# Patient Record
Sex: MALE | Race: WHITE | Employment: OTHER | URBAN - NONMETROPOLITAN AREA
[De-identification: names, ages, dates, MRNs, and addresses within clinical notes are randomized per-mention and may not be internally consistent; named-entity substitution may affect disease eponyms.]

---

## 2017-04-25 ENCOUNTER — OFFICE VISIT (OUTPATIENT)
Dept: CARDIOLOGY | Age: 82
End: 2017-04-25

## 2017-04-25 VITALS
SYSTOLIC BLOOD PRESSURE: 122 MMHG | BODY MASS INDEX: 22.13 KG/M2 | HEIGHT: 68 IN | WEIGHT: 146 LBS | HEART RATE: 71 BPM | DIASTOLIC BLOOD PRESSURE: 68 MMHG

## 2017-04-25 DIAGNOSIS — R07.82 INTERCOSTAL PAIN: Primary | ICD-10-CM

## 2017-04-25 DIAGNOSIS — I42.9 CARDIOMYOPATHY (HCC): ICD-10-CM

## 2017-04-25 DIAGNOSIS — Z95.1 S/P CABG X 3: ICD-10-CM

## 2017-04-25 PROCEDURE — 93000 ELECTROCARDIOGRAM COMPLETE: CPT | Performed by: INTERNAL MEDICINE

## 2017-04-25 PROCEDURE — G8598 ASA/ANTIPLAT THER USED: HCPCS | Performed by: INTERNAL MEDICINE

## 2017-04-25 PROCEDURE — 1036F TOBACCO NON-USER: CPT | Performed by: INTERNAL MEDICINE

## 2017-04-25 PROCEDURE — 99213 OFFICE O/P EST LOW 20 MIN: CPT | Performed by: INTERNAL MEDICINE

## 2017-04-25 PROCEDURE — 4040F PNEUMOC VAC/ADMIN/RCVD: CPT | Performed by: INTERNAL MEDICINE

## 2017-04-25 PROCEDURE — G8419 CALC BMI OUT NRM PARAM NOF/U: HCPCS | Performed by: INTERNAL MEDICINE

## 2017-04-25 PROCEDURE — 1123F ACP DISCUSS/DSCN MKR DOCD: CPT | Performed by: INTERNAL MEDICINE

## 2017-04-25 PROCEDURE — G8428 CUR MEDS NOT DOCUMENT: HCPCS | Performed by: INTERNAL MEDICINE

## 2017-07-10 ENCOUNTER — TELEPHONE (OUTPATIENT)
Dept: FAMILY MEDICINE CLINIC | Age: 82
End: 2017-07-10

## 2017-07-13 ENCOUNTER — TELEPHONE (OUTPATIENT)
Dept: FAMILY MEDICINE CLINIC | Age: 82
End: 2017-07-13

## 2017-07-27 ENCOUNTER — OFFICE VISIT (OUTPATIENT)
Dept: FAMILY MEDICINE CLINIC | Age: 82
End: 2017-07-27
Payer: MEDICARE

## 2017-07-27 ENCOUNTER — TELEPHONE (OUTPATIENT)
Dept: FAMILY MEDICINE CLINIC | Age: 82
End: 2017-07-27

## 2017-07-27 VITALS
HEART RATE: 70 BPM | BODY MASS INDEX: 23.54 KG/M2 | TEMPERATURE: 98.2 F | RESPIRATION RATE: 16 BRPM | HEIGHT: 67 IN | DIASTOLIC BLOOD PRESSURE: 62 MMHG | SYSTOLIC BLOOD PRESSURE: 120 MMHG | WEIGHT: 150 LBS

## 2017-07-27 DIAGNOSIS — N40.0 BENIGN PROSTATIC HYPERPLASIA, PRESENCE OF LOWER URINARY TRACT SYMPTOMS UNSPECIFIED, UNSPECIFIED MORPHOLOGY: ICD-10-CM

## 2017-07-27 DIAGNOSIS — I25.10 CORONARY ARTERY DISEASE INVOLVING NATIVE CORONARY ARTERY OF NATIVE HEART WITHOUT ANGINA PECTORIS: Primary | ICD-10-CM

## 2017-07-27 DIAGNOSIS — K21.9 GASTROESOPHAGEAL REFLUX DISEASE, ESOPHAGITIS PRESENCE NOT SPECIFIED: ICD-10-CM

## 2017-07-27 DIAGNOSIS — K59.00 CONSTIPATION, UNSPECIFIED CONSTIPATION TYPE: ICD-10-CM

## 2017-07-27 DIAGNOSIS — R26.0 ATAXIC GAIT: ICD-10-CM

## 2017-07-27 DIAGNOSIS — R42 DIZZINESS: ICD-10-CM

## 2017-07-27 DIAGNOSIS — G20 PARKINSON'S DISEASE (HCC): Chronic | ICD-10-CM

## 2017-07-27 DIAGNOSIS — Z23 IMMUNIZATION DUE: ICD-10-CM

## 2017-07-27 DIAGNOSIS — R29.898 DECONDITIONED LOW BACK: ICD-10-CM

## 2017-07-27 DIAGNOSIS — I25.10 CORONARY ARTERY DISEASE INVOLVING NATIVE CORONARY ARTERY OF NATIVE HEART WITHOUT ANGINA PECTORIS: ICD-10-CM

## 2017-07-27 DIAGNOSIS — G89.29 CHRONIC HIP PAIN, LEFT: ICD-10-CM

## 2017-07-27 DIAGNOSIS — F41.9 ANXIETY: ICD-10-CM

## 2017-07-27 DIAGNOSIS — M25.552 CHRONIC HIP PAIN, LEFT: ICD-10-CM

## 2017-07-27 PROCEDURE — G8427 DOCREV CUR MEDS BY ELIG CLIN: HCPCS | Performed by: NURSE PRACTITIONER

## 2017-07-27 PROCEDURE — G8420 CALC BMI NORM PARAMETERS: HCPCS | Performed by: NURSE PRACTITIONER

## 2017-07-27 PROCEDURE — 1123F ACP DISCUSS/DSCN MKR DOCD: CPT | Performed by: NURSE PRACTITIONER

## 2017-07-27 PROCEDURE — 1036F TOBACCO NON-USER: CPT | Performed by: NURSE PRACTITIONER

## 2017-07-27 PROCEDURE — 4040F PNEUMOC VAC/ADMIN/RCVD: CPT | Performed by: NURSE PRACTITIONER

## 2017-07-27 PROCEDURE — G0009 ADMIN PNEUMOCOCCAL VACCINE: HCPCS | Performed by: NURSE PRACTITIONER

## 2017-07-27 PROCEDURE — 99204 OFFICE O/P NEW MOD 45 MIN: CPT | Performed by: NURSE PRACTITIONER

## 2017-07-27 PROCEDURE — G8598 ASA/ANTIPLAT THER USED: HCPCS | Performed by: NURSE PRACTITIONER

## 2017-07-27 PROCEDURE — 90670 PCV13 VACCINE IM: CPT | Performed by: NURSE PRACTITIONER

## 2017-07-27 RX ORDER — TRAMADOL HYDROCHLORIDE 50 MG/1
50 TABLET ORAL DAILY
Qty: 30 TABLET | Refills: 0 | Status: SHIPPED | OUTPATIENT
Start: 2017-07-27 | End: 2017-08-26

## 2017-07-27 RX ORDER — PSEUDOEPHEDRINE HCL 30 MG
100 TABLET ORAL 2 TIMES DAILY
Qty: 180 CAPSULE | Refills: 3 | Status: SHIPPED | OUTPATIENT
Start: 2017-07-27

## 2017-07-27 RX ORDER — TAMSULOSIN HYDROCHLORIDE 0.4 MG/1
0.4 CAPSULE ORAL DAILY
Qty: 30 CAPSULE | Refills: 11 | Status: SHIPPED | OUTPATIENT
Start: 2017-07-27 | End: 2019-01-01 | Stop reason: SDUPTHER

## 2017-07-27 RX ORDER — OMEPRAZOLE 20 MG/1
20 CAPSULE, DELAYED RELEASE ORAL DAILY
Qty: 30 CAPSULE | Refills: 6 | Status: SHIPPED | OUTPATIENT
Start: 2017-07-27 | End: 2017-10-24 | Stop reason: SDUPTHER

## 2017-07-27 RX ORDER — GABAPENTIN 100 MG/1
100 CAPSULE ORAL 3 TIMES DAILY
Qty: 90 CAPSULE | Refills: 6 | Status: SHIPPED | OUTPATIENT
Start: 2017-07-27 | End: 2017-10-24 | Stop reason: SDUPTHER

## 2017-07-27 RX ORDER — PAROXETINE 30 MG/1
30 TABLET, FILM COATED ORAL EVERY MORNING
Qty: 30 TABLET | Refills: 6 | Status: SHIPPED | OUTPATIENT
Start: 2017-07-27 | End: 2017-10-24 | Stop reason: SDUPTHER

## 2017-07-27 RX ORDER — CARVEDILOL 3.12 MG/1
3.12 TABLET ORAL 2 TIMES DAILY WITH MEALS
COMMUNITY
End: 2017-07-27 | Stop reason: SDUPTHER

## 2017-07-27 RX ORDER — MECLIZINE HCL 12.5 MG/1
12.5 TABLET ORAL 3 TIMES DAILY PRN
Qty: 90 TABLET | Refills: 6 | Status: SHIPPED | OUTPATIENT
Start: 2017-07-27 | End: 2017-10-24 | Stop reason: SDUPTHER

## 2017-07-27 RX ORDER — GABAPENTIN 100 MG/1
100 CAPSULE ORAL 3 TIMES DAILY
COMMUNITY
End: 2017-07-27 | Stop reason: SDUPTHER

## 2017-07-27 ASSESSMENT — PATIENT HEALTH QUESTIONNAIRE - PHQ9
1. LITTLE INTEREST OR PLEASURE IN DOING THINGS: 0
SUM OF ALL RESPONSES TO PHQ9 QUESTIONS 1 & 2: 0
2. FEELING DOWN, DEPRESSED OR HOPELESS: 0
SUM OF ALL RESPONSES TO PHQ QUESTIONS 1-9: 0

## 2017-07-27 ASSESSMENT — ENCOUNTER SYMPTOMS
BACK PAIN: 1
SINUS PRESSURE: 0
RESPIRATORY NEGATIVE: 1
ABDOMINAL PAIN: 0
RHINORRHEA: 0
ABDOMINAL DISTENTION: 0

## 2017-07-28 RX ORDER — ISOSORBIDE MONONITRATE 30 MG/1
30 TABLET, EXTENDED RELEASE ORAL DAILY
Qty: 90 TABLET | Refills: 0 | Status: SHIPPED | OUTPATIENT
Start: 2017-07-28 | End: 2017-10-26 | Stop reason: SDUPTHER

## 2017-07-28 RX ORDER — CLOPIDOGREL BISULFATE 75 MG/1
75 TABLET ORAL DAILY
Qty: 90 TABLET | Refills: 0 | Status: SHIPPED | OUTPATIENT
Start: 2017-07-28 | End: 2017-10-26 | Stop reason: SDUPTHER

## 2017-07-28 RX ORDER — CARVEDILOL 3.12 MG/1
3.12 TABLET ORAL 2 TIMES DAILY WITH MEALS
Qty: 180 TABLET | Refills: 0 | Status: SHIPPED | OUTPATIENT
Start: 2017-07-28 | End: 2017-10-26 | Stop reason: SDUPTHER

## 2017-09-12 ENCOUNTER — TELEPHONE (OUTPATIENT)
Dept: FAMILY MEDICINE CLINIC | Age: 82
End: 2017-09-12

## 2017-10-24 ENCOUNTER — TELEPHONE (OUTPATIENT)
Dept: FAMILY MEDICINE CLINIC | Age: 82
End: 2017-10-24

## 2017-10-24 ENCOUNTER — OFFICE VISIT (OUTPATIENT)
Dept: FAMILY MEDICINE CLINIC | Age: 82
End: 2017-10-24
Payer: MEDICARE

## 2017-10-24 VITALS — SYSTOLIC BLOOD PRESSURE: 128 MMHG | TEMPERATURE: 98.1 F | DIASTOLIC BLOOD PRESSURE: 82 MMHG | HEART RATE: 70 BPM

## 2017-10-24 DIAGNOSIS — G20 PARKINSON'S DISEASE (HCC): Chronic | ICD-10-CM

## 2017-10-24 DIAGNOSIS — K21.9 GASTROESOPHAGEAL REFLUX DISEASE, ESOPHAGITIS PRESENCE NOT SPECIFIED: ICD-10-CM

## 2017-10-24 DIAGNOSIS — R53.1 GENERALIZED WEAKNESS: ICD-10-CM

## 2017-10-24 DIAGNOSIS — Z23 NEEDS FLU SHOT: ICD-10-CM

## 2017-10-24 DIAGNOSIS — F02.818 LATE ONSET ALZHEIMER'S DISEASE WITH BEHAVIORAL DISTURBANCE (HCC): Primary | ICD-10-CM

## 2017-10-24 DIAGNOSIS — F41.9 ANXIETY: ICD-10-CM

## 2017-10-24 DIAGNOSIS — M25.552 LEFT HIP PAIN: ICD-10-CM

## 2017-10-24 DIAGNOSIS — R53.1 WEAKNESS: ICD-10-CM

## 2017-10-24 DIAGNOSIS — G30.1 LATE ONSET ALZHEIMER'S DISEASE WITH BEHAVIORAL DISTURBANCE (HCC): Primary | ICD-10-CM

## 2017-10-24 PROCEDURE — 90688 IIV4 VACCINE SPLT 0.5 ML IM: CPT | Performed by: NURSE PRACTITIONER

## 2017-10-24 PROCEDURE — G8427 DOCREV CUR MEDS BY ELIG CLIN: HCPCS | Performed by: NURSE PRACTITIONER

## 2017-10-24 PROCEDURE — 1036F TOBACCO NON-USER: CPT | Performed by: NURSE PRACTITIONER

## 2017-10-24 PROCEDURE — G8484 FLU IMMUNIZE NO ADMIN: HCPCS | Performed by: NURSE PRACTITIONER

## 2017-10-24 PROCEDURE — G8420 CALC BMI NORM PARAMETERS: HCPCS | Performed by: NURSE PRACTITIONER

## 2017-10-24 PROCEDURE — G8598 ASA/ANTIPLAT THER USED: HCPCS | Performed by: NURSE PRACTITIONER

## 2017-10-24 PROCEDURE — 4040F PNEUMOC VAC/ADMIN/RCVD: CPT | Performed by: NURSE PRACTITIONER

## 2017-10-24 PROCEDURE — 1123F ACP DISCUSS/DSCN MKR DOCD: CPT | Performed by: NURSE PRACTITIONER

## 2017-10-24 PROCEDURE — 99214 OFFICE O/P EST MOD 30 MIN: CPT | Performed by: NURSE PRACTITIONER

## 2017-10-24 PROCEDURE — G0008 ADMIN INFLUENZA VIRUS VAC: HCPCS | Performed by: NURSE PRACTITIONER

## 2017-10-24 RX ORDER — PAROXETINE 30 MG/1
30 TABLET, FILM COATED ORAL EVERY MORNING
Qty: 30 TABLET | Refills: 6 | Status: SHIPPED | OUTPATIENT
Start: 2017-10-24 | End: 2018-05-15 | Stop reason: SDUPTHER

## 2017-10-24 RX ORDER — OMEPRAZOLE 20 MG/1
20 CAPSULE, DELAYED RELEASE ORAL DAILY
Qty: 30 CAPSULE | Refills: 6 | Status: SHIPPED | OUTPATIENT
Start: 2017-10-24 | End: 2018-05-15 | Stop reason: SDUPTHER

## 2017-10-24 RX ORDER — MECLIZINE HCL 12.5 MG/1
12.5 TABLET ORAL 3 TIMES DAILY PRN
Qty: 90 TABLET | Refills: 6 | Status: SHIPPED | OUTPATIENT
Start: 2017-10-24 | End: 2018-05-15 | Stop reason: SDUPTHER

## 2017-10-24 RX ORDER — GABAPENTIN 100 MG/1
100 CAPSULE ORAL 3 TIMES DAILY
Qty: 90 CAPSULE | Refills: 6 | Status: SHIPPED | OUTPATIENT
Start: 2017-10-24 | End: 2018-05-15 | Stop reason: SDUPTHER

## 2017-10-24 ASSESSMENT — ENCOUNTER SYMPTOMS
CONSTIPATION: 0
SHORTNESS OF BREATH: 1
COUGH: 0
ABDOMINAL DISTENTION: 0
WHEEZING: 0
RHINORRHEA: 0
CHEST TIGHTNESS: 0
NAUSEA: 1
STRIDOR: 0
CHOKING: 0
VOMITING: 0
DIARRHEA: 0
ABDOMINAL PAIN: 0
SINUS PAIN: 0

## 2017-10-24 NOTE — PROGRESS NOTES
[address]  [address] [address]  Dept: [phone]  Dept Fax: [phone]  Loc: [phone]    Gene Claudeen Anton is a 80 y.o. male who presents today for his medical conditions/complaints as noted below. Gene Claudeen Anton is c/o of 3 Month Follow-Up (anxiety) and Hip Pain (left, worse in the last month)      HPI:     Pt here for a follow up . Pt is currently living at home with his two sons. He has been diagnosed with Alzheimer;s Dementia with behavioral disturbance. He had been on namenda In the past and taken off. He is not oriented to person, place or time. He does answer questions when asked and verbalized normally. Sons state they have noticed for the last week their father has been getting agitated in the late evening and early morning and seeing things that are not there. He has waved his arms around at one point but has not been physical.      Sons state his left hip has been hurting and it has impacted his ability to move and get around, so he has stayed in bed a lot during the day and his wheelchair. His legs have gotten weak and unsteady and he is weak in general. He has been eating less and has a decreased appetite. He has an extensive heart history and is current with Cardiology. He is taking his acid reflux meds and his symptoms are controlled.           Current Outpatient Prescriptions   Medication Sig Dispense Refill    gabapentin (NEURONTIN) 100 MG capsule Take 1 capsule by mouth 3 times daily 90 capsule 6    omeprazole (PRILOSEC) 20 MG delayed release capsule Take 1 capsule by mouth daily 30 capsule 6    PARoxetine (PAXIL) 30 MG tablet Take 1 tablet by mouth every morning 30 tablet 6    meclizine (ANTIVERT) 12.5 MG tablet Take 1 tablet by mouth 3 times daily as needed for Dizziness 90 tablet 6    clopidogrel (PLAVIX) 75 MG tablet Take 1 tablet by mouth daily 90 tablet 0    isosorbide mononitrate (IMDUR) 30 MG extended release tablet Take 1 tablet by mouth daily 90 tablet 0    carvedilol (COREG) 3.125 MG tablet Take 1 tablet by mouth 2 times daily (with meals) 180 tablet 0    aspirin 81 MG tablet Take 1 tablet by mouth daily 30 tablet 11    tamsulosin (FLOMAX) 0.4 MG capsule Take 1 capsule by mouth daily 30 capsule 11    Calcium Carbonate Antacid (TUMS PO) Take by mouth 4 times daily      traMADol (ULTRAM) 50 MG tablet Take 50 mg by mouth every 6 hours as needed for Pain      Misc. Devices [facility]) MISC 1 applicator by Does not apply route continuous. 1 each 0    acetaminophen (TYLENOL) 325 MG tablet Take 2 tablets by mouth every 6 hours as needed for Pain (For mild pain level 1-3 or for fever > 100.5). 120 tablet 0    nitroGLYCERIN (NITROSTAT) 0.4 MG SL tablet Place 1 tablet under the tongue every 5 minutes as needed for Chest pain. 25 tablet 6    docusate (COLACE, DULCOLAX) 100 MG CAPS Take 100 mg by mouth 2 times daily 180 capsule 3    aluminum & magnesium hydroxide-simethicone (MAALOX ADVANCED) 200-200-20 MG/5ML SUSP suspension Take 5 mLs by mouth every 6 hours as needed for Indigestion. 1 Bottle 0    Cyanocobalamin (VITAMIN B-12 CR) 1000 MCG TBCR Take 1,000 mcg by mouth daily.  Cholecalciferol (VITAMIN D-3) 5000 UNITS TABS Take 1 tablet by mouth daily.  magnesium hydroxide (MILK OF MAGNESIA) 400 MG/5ML suspension Take 30 mLs by mouth daily as needed for Constipation.  multivitamin (THERAGRAN) per tablet Take 1 tablet by mouth daily. 90 tablet 0     No current facility-administered medications for this visit.         Past Medical History:   Diagnosis Date    Alzheimer's dementia     Anxiety     Arthritis     At[facility] fibrillation ([facility] [address]     CAD (coronary artery disease)     Dementia     Depression     Fall at home     GERD (gastroesophageal reflux disease)     Heart attack 5/11/13    Hernia     Hyperlipidemia     Hypertension     Iron deficiency anemia     Irritable bowel syndrome     Pacemaker age 58    Parkinson disease ([facility] [address]     Parkinsons [facility])       Past Surgical History:   Procedure Laterality Date    BACK SURGERY  age [de-identified]     CARDIAC SURGERY  age 60    COLONOSCOPY  over ten years ago   Maryse Watson  Patient unsure     PACEMAKER PLACEMENT  age 58, replaced age 80    VASCULAR SURGERY  age 58      Family History   Problem Relation Age of Onset    Heart Disease Father      Social History   Substance Use Topics    Smoking status: Former Smoker     Years: 3.00     Types: Cigars     Quit date: 10/11/2008    Smokeless tobacco: Never Used    Alcohol use No        Allergies   Allergen Reactions    Pcn [Penicillins] Hives    Terramycin [Oxytetracycline]        Health Maintenance   Topic Date Due    Zostavax vaccine  03/15/1990    Flu vaccine (1) 09/01/2017    Pneumococcal low/med risk (2 of 2 - PPSV23) 07/27/2018    DTaP/Tdap/Td vaccine (2 - Td) 03/19/2025       Subjective:      Review of Systems   Constitutional: Positive for fatigue (easily fatigues with activity). Negative for chills and fever. HENT: Negative for congestion, rhinorrhea and sinus pain. Respiratory: Positive for shortness of breath (with exertion). Negative for cough, choking, chest tightness, wheezing and stridor. Cardiovascular: Negative. Gastrointestinal: Positive for nausea. Negative for abdominal distention, abdominal pain, constipation, diarrhea and vomiting. Genitourinary: Negative for difficulty urinating and dysuria. Musculoskeletal: Positive for arthralgias, gait problem and myalgias. Skin: Negative. Neurological: Positive for weakness. Negative for dizziness, tremors and headaches. Psychiatric/Behavioral: Positive for agitation, behavioral problems and sleep disturbance. Negative for self-injury and suicidal ideas.        Objective:     /82   Pulse 70   Temp 98.1 °F (36.7 °C) (Oral)     Physical Exam   Constitutional: He appears well-developed and well-nourished. No distress. HENT:   Right Ear: Hearing, tympanic membrane, external ear and ear canal normal.   Left Ear: Hearing, tympanic membrane, external ear and ear canal normal.   Nose: Nose normal.   Mouth/Throat: Oropharynx is clear and moist.   Cardiovascular: Normal rate, regular rhythm, normal heart sounds and intact distal pulses. No murmur heard. Pulmonary/Chest: Effort normal and breath sounds normal. He has no wheezes. Abdominal: Soft. Bowel sounds are normal. He exhibits no distension. There is no tenderness. Musculoskeletal:        Left hip: He exhibits decreased range of motion, decreased strength and bony tenderness. He exhibits no swelling and no crepitus. Pt presents in a wheelchair today, patient stood up and unable to ambulate without an unsteady gait, shuffling gait noted. .    Bilateral LE weak all LE pulses equal and strong  Upper extremity strength equal and weak. Neurological: He is alert. He is disoriented (pt not oriented to place or time oriented to person). He displays no tremor. No cranial nerve deficit or sensory deficit. He exhibits abnormal muscle tone. Coordination and gait abnormal.   Skin: Skin is warm and dry. Psychiatric: He has a normal mood and affect. Judgment normal. His speech is delayed. He is not combative. Thought content is not delusional. He expresses no suicidal plans and no homicidal plans. He exhibits abnormal recent memory and abnormal remote memory. He is inattentive. Nursing note and vitals reviewed. Assessment/Plan:          1. Left hip pain    - XR HIP LEFT (2-3 VIEWS); Future  - [facility]. with tylenol, will await x-ray results  2. Late onset Alzheimer's disease with behavioral disturbance  Has Neurology app[facility] coming up  [facility]    3. Generalized weakness    - [facility]    4. Parkinson's disease [facility])  Await Neurology input and evaluation    5.  Anxiety    - PARoxetine (PAXIL) 30 MG tablet; Take 1 tablet by mouth every morning  Dispense: 30 tablet; Refill: 6        - gabapentin (NEURONTIN) 100 MG capsule; Take 1 capsule by mouth 3 times daily  Dispense: 90 capsule; Refill: 6    7. Gastroesophageal reflux disease, esophagitis presence not specified  GERD diet  - omeprazole (PRILOSEC) 20 MG delayed release capsule; Take 1 capsule by mouth daily  Dispense: 30 capsule; Refill: 6    - meclizine (ANTIVERT) 12.5 MG tablet; Take 1 tablet by mouth 3 times daily as needed for Dizziness  Dispense: 90 tablet; Refill: 6    9. Needs flu shot    - INFLUENZA, QUADV, 3 YRS AND OLDER, IM, MDV, 0.5ML (Roberto Landry)    10. Weakness    - [address]      Return in about 6 months (around 4/24/2018) for check up and med refill. Reccommended tobacco cessation options including pharmacologic methods, counseled great than 3 minutes during this visit:  Yes  []  No  []       Patient given educational materials - see patient instructions. Discussed use, benefit, and side effects of prescribed medications. All patient questions answered. Pt voiced understanding. Reviewed health maintenance. Instructed to continue current medications, diet and exercise. Patient agreed with treatment plan. Follow up as directed.        Electronically signed by Mariia Flores CNP on 10/24/2017 at 1:36 PM

## 2017-10-26 DIAGNOSIS — I25.10 CORONARY ARTERY DISEASE INVOLVING NATIVE CORONARY ARTERY OF NATIVE HEART WITHOUT ANGINA PECTORIS: ICD-10-CM

## 2017-10-26 RX ORDER — CARVEDILOL 3.12 MG/1
3.12 TABLET ORAL 2 TIMES DAILY WITH MEALS
Qty: 180 TABLET | Refills: 0 | Status: SHIPPED | OUTPATIENT
Start: 2017-10-26 | End: 2018-01-16 | Stop reason: SDUPTHER

## 2017-10-26 RX ORDER — ISOSORBIDE MONONITRATE 30 MG/1
30 TABLET, EXTENDED RELEASE ORAL DAILY
Qty: 90 TABLET | Refills: 0 | Status: SHIPPED | OUTPATIENT
Start: 2017-10-26 | End: 2018-01-16 | Stop reason: SDUPTHER

## 2017-10-26 RX ORDER — CLOPIDOGREL BISULFATE 75 MG/1
75 TABLET ORAL DAILY
Qty: 90 TABLET | Refills: 0 | Status: SHIPPED | OUTPATIENT
Start: 2017-10-26 | End: 2018-01-16 | Stop reason: SDUPTHER

## 2017-10-26 NOTE — TELEPHONE ENCOUNTER
Alfred Phan called requesting a refill on the following medications:  Requested Prescriptions     Pending Prescriptions Disp Refills    isosorbide mononitrate (IMDUR) 30 MG extended release tablet 90 tablet 0     Sig: Take 1 tablet by mouth daily    clopidogrel (PLAVIX) 75 MG tablet 90 tablet 0     Sig: Take 1 tablet by mouth daily    carvedilol (COREG) 3.125 MG tablet 180 tablet 0     Sig: Take 1 tablet by mouth 2 times daily (with meals)     Pharmacy verified:  [email or web address]      Date of last visit: 4/25/17  Date of next visit (if applicable): 76/26/0987        Date of last fill and quantity (to be completed by clinical staff)  Pharmacy name: [facility] on [address]

## 2017-12-07 ENCOUNTER — TELEPHONE (OUTPATIENT)
Dept: FAMILY MEDICINE CLINIC | Age: 82
End: 2017-12-07

## 2017-12-07 NOTE — TELEPHONE ENCOUNTER
Leigh Travis from [facility] OT wanting to inform you pt declined services today states he fell over the weekend, he was standing in front of his wheel chair and he toppled backwards pulling the chair on top of him he didn't go to ER and he refused getting x-rays he is c/o pain in the right rib area. Pt has an appt with you 12/13/17.   Pt also has [facility] PT and they saw pt this am.

## 2017-12-13 ENCOUNTER — TELEPHONE (OUTPATIENT)
Dept: FAMILY MEDICINE CLINIC | Age: 82
End: 2017-12-13

## 2018-01-16 ENCOUNTER — OFFICE VISIT (OUTPATIENT)
Dept: FAMILY MEDICINE CLINIC | Age: 83
End: 2018-01-16
Payer: MEDICARE

## 2018-01-16 VITALS
BODY MASS INDEX: 25.4 KG/M2 | SYSTOLIC BLOOD PRESSURE: 148 MMHG | DIASTOLIC BLOOD PRESSURE: 104 MMHG | TEMPERATURE: 97.7 F | WEIGHT: 148.8 LBS | HEIGHT: 64 IN | HEART RATE: 62 BPM

## 2018-01-16 DIAGNOSIS — R10.9 STOMACH PAIN: ICD-10-CM

## 2018-01-16 DIAGNOSIS — I10 HYPERTENSION, UNSPECIFIED TYPE: Primary | ICD-10-CM

## 2018-01-16 DIAGNOSIS — M25.552 LEFT HIP PAIN: ICD-10-CM

## 2018-01-16 DIAGNOSIS — I25.10 CORONARY ARTERY DISEASE INVOLVING NATIVE CORONARY ARTERY OF NATIVE HEART WITHOUT ANGINA PECTORIS: ICD-10-CM

## 2018-01-16 PROCEDURE — G8427 DOCREV CUR MEDS BY ELIG CLIN: HCPCS | Performed by: NURSE PRACTITIONER

## 2018-01-16 PROCEDURE — 1036F TOBACCO NON-USER: CPT | Performed by: NURSE PRACTITIONER

## 2018-01-16 PROCEDURE — 1123F ACP DISCUSS/DSCN MKR DOCD: CPT | Performed by: NURSE PRACTITIONER

## 2018-01-16 PROCEDURE — G8484 FLU IMMUNIZE NO ADMIN: HCPCS | Performed by: NURSE PRACTITIONER

## 2018-01-16 PROCEDURE — G8419 CALC BMI OUT NRM PARAM NOF/U: HCPCS | Performed by: NURSE PRACTITIONER

## 2018-01-16 PROCEDURE — G8598 ASA/ANTIPLAT THER USED: HCPCS | Performed by: NURSE PRACTITIONER

## 2018-01-16 PROCEDURE — 99214 OFFICE O/P EST MOD 30 MIN: CPT | Performed by: NURSE PRACTITIONER

## 2018-01-16 PROCEDURE — 4040F PNEUMOC VAC/ADMIN/RCVD: CPT | Performed by: NURSE PRACTITIONER

## 2018-01-16 RX ORDER — ISOSORBIDE MONONITRATE 30 MG/1
30 TABLET, EXTENDED RELEASE ORAL DAILY
Qty: 90 TABLET | Refills: 1 | Status: SHIPPED | OUTPATIENT
Start: 2018-01-16 | End: 2018-02-06 | Stop reason: SDUPTHER

## 2018-01-16 RX ORDER — CELECOXIB 100 MG/1
100 CAPSULE ORAL 2 TIMES DAILY
Qty: 60 CAPSULE | Refills: 3 | Status: SHIPPED | OUTPATIENT
Start: 2018-01-16 | End: 2018-05-15 | Stop reason: SDUPTHER

## 2018-01-16 RX ORDER — LISINOPRIL 5 MG/1
5 TABLET ORAL DAILY
Qty: 90 TABLET | Refills: 1 | Status: SHIPPED | OUTPATIENT
Start: 2018-01-16 | End: 2018-02-06 | Stop reason: SDUPTHER

## 2018-01-16 RX ORDER — CARVEDILOL 3.12 MG/1
3.12 TABLET ORAL 2 TIMES DAILY WITH MEALS
Qty: 180 TABLET | Refills: 1 | Status: SHIPPED | OUTPATIENT
Start: 2018-01-16 | End: 2018-02-06 | Stop reason: SDUPTHER

## 2018-01-16 RX ORDER — CLOPIDOGREL BISULFATE 75 MG/1
75 TABLET ORAL DAILY
Qty: 90 TABLET | Refills: 1 | Status: SHIPPED | OUTPATIENT
Start: 2018-01-16 | End: 2018-02-06 | Stop reason: SDUPTHER

## 2018-01-16 ASSESSMENT — ENCOUNTER SYMPTOMS
SINUS PAIN: 0
ABDOMINAL PAIN: 0
NAUSEA: 1
RECTAL PAIN: 0
VOMITING: 0
RHINORRHEA: 0
DIARRHEA: 0
SINUS PRESSURE: 0
ABDOMINAL DISTENTION: 0
BACK PAIN: 1
RESPIRATORY NEGATIVE: 1

## 2018-01-16 NOTE — PROGRESS NOTES
[address]  [address] [address]  Dept: [phone]  Dept Fax: [phone]  Loc: [phone]    Alfred Long is a 80 y.o. male who presents today for his medical conditions/complaints as noted below. Alfred Long is c/o of Hip Pain (left hip pain radiating down left leg for past 3 months); Insomnia (awake all night, and sleeping all day for past 4 months ); and Abdominal Pain (stomach pains after eating for past 2 months )      HPI:     Pt presents for a follow up visit. Pt has continued left hip pain and his son states it is getting worse. The patient states the pain occurs at random throughout the day, the pain starts at the left lumbar area and will travel to the left hip and groin area and travel down the left leg. The patient rates the pain as 5/10 it comes and goes, sitting in his wheelchair makes it worse and then he gets stiff and does not want to get up and move around. The pain is a deep ache and he denies numbness or tingling in his leg. He has been taking 2 500 extra strength tablets three times a day. He states this does help but the pain comes back. He has not tried ibuprofen he has used norco in the past. He has been taking neurontin 100 mg tid for several months and does not think it is helping. Pt son also mentioned pt is sleeping all day and then he is up and wired at night. I did discuss the possibility that the neurontin is making the pt tired and this could be the cause of sleeping all day. With shared decision making the treatment plan to be implemented is to wean off the am dose of neurontin and monitor the pain, If pain worsens return to neurontin if pain is staying the same after one week wean off the afternoon dose and keep the  Pm dose for the time being. Will also add celebrex in addition to tylenol for pain control and monitor.       I did ask the son about physical therapy and he states his father did Review of Systems   Constitutional: Negative for chills, fatigue and fever. HENT: Negative for rhinorrhea, sinus pain and sinus pressure. Respiratory: Negative. Cardiovascular: Negative. Gastrointestinal: Positive for nausea (off and on after eating, once or twice a week. ). Negative for abdominal distention, abdominal pain, diarrhea, rectal pain and vomiting. He does not have weight loss   Genitourinary: Negative for difficulty urinating and dysuria. Musculoskeletal: Positive for arthralgias, back pain, gait problem and myalgias. Wheelchair bound   Skin: Negative. Neurological: Negative for dizziness and headaches. Psychiatric/Behavioral: Negative for self-injury, sleep disturbance and suicidal ideas. Objective:     BP (!) 148/104   Pulse 62   Temp 97.7 °F (36.5 °C) (Oral)   Ht 5' 4\" (1.626 m)   Wt 148 lb 12.8 oz (67.5 kg)   BMI 25.54 kg/m²     Physical Exam   Cardiovascular: Normal rate, regular rhythm, normal heart sounds and intact distal pulses. No murmur heard. Pulmonary/Chest: Effort normal and breath sounds normal. No respiratory distress. He has no wheezes. Abdominal: Soft. Bowel sounds are normal. He exhibits no distension. There is no tenderness. Musculoskeletal:        Left hip: He exhibits normal range of motion, normal strength, no bony tenderness, no swelling, no crepitus and no deformity. Lumbar back: He exhibits decreased range of motion, bony tenderness and pain. He exhibits no spasm and normal pulse. Sitting in a wheelchair    Bilateral pedal push and pull equal and weak, no pain with internal or external rotation of left leg. NO decreased sensation noted. Lumbar pain with palpation at left SI joint. NO erythema or joint edema noted. Skin: Skin is warm and dry. Nursing note and vitals reviewed. Assessment/Plan:          1.  Coronary artery disease involving native coronary artery of native heart without angina pectoris*  -

## 2018-02-06 ENCOUNTER — OFFICE VISIT (OUTPATIENT)
Dept: CARDIOLOGY CLINIC | Age: 83
End: 2018-02-06
Payer: MEDICARE

## 2018-02-06 VITALS
HEIGHT: 67 IN | HEART RATE: 70 BPM | BODY MASS INDEX: 23.54 KG/M2 | WEIGHT: 150 LBS | SYSTOLIC BLOOD PRESSURE: 155 MMHG | DIASTOLIC BLOOD PRESSURE: 86 MMHG

## 2018-02-06 DIAGNOSIS — I25.83 CORONARY ARTERY DISEASE DUE TO LIPID RICH PLAQUE: Primary | ICD-10-CM

## 2018-02-06 DIAGNOSIS — I25.10 CORONARY ARTERY DISEASE DUE TO LIPID RICH PLAQUE: Primary | ICD-10-CM

## 2018-02-06 DIAGNOSIS — I10 HYPERTENSION, UNSPECIFIED TYPE: ICD-10-CM

## 2018-02-06 DIAGNOSIS — I25.10 CORONARY ARTERY DISEASE INVOLVING NATIVE CORONARY ARTERY OF NATIVE HEART WITHOUT ANGINA PECTORIS: ICD-10-CM

## 2018-02-06 PROCEDURE — 4040F PNEUMOC VAC/ADMIN/RCVD: CPT | Performed by: INTERNAL MEDICINE

## 2018-02-06 PROCEDURE — G8484 FLU IMMUNIZE NO ADMIN: HCPCS | Performed by: INTERNAL MEDICINE

## 2018-02-06 PROCEDURE — G8420 CALC BMI NORM PARAMETERS: HCPCS | Performed by: INTERNAL MEDICINE

## 2018-02-06 PROCEDURE — 1036F TOBACCO NON-USER: CPT | Performed by: INTERNAL MEDICINE

## 2018-02-06 PROCEDURE — 1123F ACP DISCUSS/DSCN MKR DOCD: CPT | Performed by: INTERNAL MEDICINE

## 2018-02-06 PROCEDURE — G8598 ASA/ANTIPLAT THER USED: HCPCS | Performed by: INTERNAL MEDICINE

## 2018-02-06 PROCEDURE — G8427 DOCREV CUR MEDS BY ELIG CLIN: HCPCS | Performed by: INTERNAL MEDICINE

## 2018-02-06 PROCEDURE — 99213 OFFICE O/P EST LOW 20 MIN: CPT | Performed by: INTERNAL MEDICINE

## 2018-02-06 RX ORDER — ISOSORBIDE MONONITRATE 30 MG/1
30 TABLET, EXTENDED RELEASE ORAL DAILY
Qty: 90 TABLET | Refills: 1 | Status: SHIPPED | OUTPATIENT
Start: 2018-02-06 | End: 2018-09-18 | Stop reason: SDUPTHER

## 2018-02-06 RX ORDER — LISINOPRIL 5 MG/1
5 TABLET ORAL DAILY
Qty: 90 TABLET | Refills: 1 | Status: SHIPPED | OUTPATIENT
Start: 2018-02-06 | End: 2018-09-18 | Stop reason: SDUPTHER

## 2018-02-06 RX ORDER — NITROGLYCERIN 0.4 MG/1
0.4 TABLET SUBLINGUAL EVERY 5 MIN PRN
Qty: 25 TABLET | Refills: 6 | Status: SHIPPED | OUTPATIENT
Start: 2018-02-06

## 2018-02-06 RX ORDER — CLOPIDOGREL BISULFATE 75 MG/1
75 TABLET ORAL DAILY
Qty: 90 TABLET | Refills: 1 | Status: SHIPPED | OUTPATIENT
Start: 2018-02-06 | End: 2019-01-01 | Stop reason: SDUPTHER

## 2018-02-06 RX ORDER — CARVEDILOL 3.12 MG/1
3.12 TABLET ORAL 2 TIMES DAILY WITH MEALS
Qty: 180 TABLET | Refills: 1 | Status: SHIPPED | OUTPATIENT
Start: 2018-02-06 | End: 2018-09-18 | Stop reason: SDUPTHER

## 2018-02-06 NOTE — PROGRESS NOTES
(FLOMAX) 0.4 MG capsule, Take 1 capsule by mouth daily, Disp: 30 capsule, Rfl: 11    Calcium Carbonate Antacid (TUMS PO), Take by mouth 4 times daily, Disp: , Rfl:     traMADol (ULTRAM) 50 MG tablet, Take 50 mg by mouth every 6 hours as needed for Pain, Disp: , Rfl:     Misc. Devices [facility]) MISC, 1 applicator by Does not apply route continuous. , Disp: 1 each, Rfl: 0    aluminum & magnesium hydroxide-simethicone (MAALOX ADVANCED) 200-200-20 MG/5ML SUSP suspension, Take 5 mLs by mouth every 6 hours as needed for Indigestion. , Disp: 1 Bottle, Rfl: 0    Cyanocobalamin (VITAMIN B-12 CR) 1000 MCG TBCR, Take 1,000 mcg by mouth daily. , Disp: , Rfl:     Cholecalciferol (VITAMIN D-3) 5000 UNITS TABS, Take 1 tablet by mouth daily. , Disp: , Rfl:     acetaminophen (TYLENOL) 325 MG tablet, Take 2 tablets by mouth every 6 hours as needed for Pain (For mild pain level 1-3 or for fever > 100.5). , Disp: 120 tablet, Rfl: 0    magnesium hydroxide (MILK OF MAGNESIA) 400 MG/5ML suspension, Take 30 mLs by mouth daily as needed for Constipation. , Disp: , Rfl:     multivitamin (THERAGRAN) per tablet, Take 1 tablet by mouth daily. , Disp: 90 tablet, Rfl: 0    Past Medical History  Gene  has a past medical history of Alzheimer's dementia; Anxiety; Arthritis; Atrial fibrillation ([address] CAD (coronary artery disease); Dementia; Depression; Fall at home; GERD (gastroesophageal reflux disease); Heart attack; Hernia; Hyperlipidemia; Hypertension; Iron deficiency anemia; Irritable bowel syndrome; Pacemaker; Parkinson disease ([address] and Parkinsons ([address] Social History  Gene  reports that he quit smoking about 9 years ago. His smoking use included Cigars. He quit after 3.00 years of use. He has never used smokeless tobacco. He reports that he does not drink alcohol or use drugs. Family History  Gene family history includes Heart Disease in his father.     Past Surgical History   Past Surgical History:   Procedure Laterality Date Valve structure was normal. There was normal leaflet separation. LEFT ATRIUM: Size was normal.    RIGHT VENTRICLE: The size was normal. Systolic function was normal. Wall  thickness was normal.    PULMONIC VALVE: Leaflets exhibited normal thickness, no calcification, and  normal cuspal separation. PULMONARY ARTERY: The size was normal.    TRICUSPID VALVE: The valve structure was normal. There was normal leaflet  separation. DOPPLER: There was mild regurgitation. RIGHT ATRIUM: Size was normal.    SYSTEMIC VEINS: IVC: The inferior vena cava was normal in size. PERICARDIUM: There was no pericardial effusion. The pericardium was normal in  appearance. SYSTEM MEASUREMENT TABLES    2D mode  LA Dimension (2D): 3.9 cm  FS (2D-Cubed): 16.1 %  FS (2D-Teich): 16.1 %  IVSd (2D): 0.9 cm  IVSd; Mean chosen (2D): 0.9 cm  LVIDd (2D): 5.6 cm  LVIDs (2D): 4.7 cm  LVPWd (2D): 1.1 cm  RVIDd (2D): 2.4 cm    M mode  AoR Diam (MM): 3.2 cm  AV Cusp Sep (MM): 2 cm  MV D-E Exc: 1.5 cm  MV EF [place] (MM): 8.2 cm/s    Unspecified Scan Mode  LVOT Vmax: 91.3 cm/s  MV Peak E Jono; Antegrade Flow: 104 cm/s  Vmax; Antegrade Flow: 87.9 cm/s  TV Peak E Jono; Antegrade Flow: 71.6 cm/s    SUMMARY:    Left ventricle:  Size was normal.  Systolic function was moderately to markedly reduced. Ejection fraction was  estimated in the range of 35 % to 40 %. The basal anteroseptal wall was hypokinetic. The mid anteroseptal wall was hypokinetic. The basal posterior wall was hypokinetic. The mid posterior wall was hypokinetic. Tricuspid valve: There was mild regurgitation. Aorta, systemic arteries: The root exhibited mild dilatation. Prepared and signed by    Kamini Terrell DO  Signed 21-Jun-2014 13:42:56         STRESS:    CATH:4/21/13:  CORONARY ANGIOGRAPHY:    1. SVG to circumflex or ramus is totally occluded. 2.    SVG to the RCA is totally occluded.     3.    SVG to the PDA is widely patent with proximal lesion of 30% but the

## 2018-02-08 ASSESSMENT — ENCOUNTER SYMPTOMS
BLOATING: 0
BOWEL INCONTINENCE: 0
COUGH: 0
DOUBLE VISION: 0
DIARRHEA: 0
SHORTNESS OF BREATH: 0
HEMOPTYSIS: 0
CONSTIPATION: 0
EYE PAIN: 0
ORTHOPNEA: 0
CHANGE IN BOWEL HABIT: 0
BLURRED VISION: 0
HOARSE VOICE: 0
EYE DISCHARGE: 0
SPUTUM PRODUCTION: 0
BACK PAIN: 0
ABDOMINAL PAIN: 0

## 2018-02-23 ENCOUNTER — INITIAL CONSULT (OUTPATIENT)
Dept: NEUROLOGY | Age: 83
End: 2018-02-23
Payer: MEDICARE

## 2018-02-23 VITALS
HEART RATE: 78 BPM | SYSTOLIC BLOOD PRESSURE: 132 MMHG | DIASTOLIC BLOOD PRESSURE: 68 MMHG | HEIGHT: 68 IN | WEIGHT: 150 LBS | BODY MASS INDEX: 22.73 KG/M2

## 2018-02-23 DIAGNOSIS — R41.3 MEMORY PROBLEM: Primary | ICD-10-CM

## 2018-02-23 DIAGNOSIS — R44.1 VISUAL HALLUCINATION: ICD-10-CM

## 2018-02-23 PROCEDURE — G8598 ASA/ANTIPLAT THER USED: HCPCS | Performed by: PSYCHIATRY & NEUROLOGY

## 2018-02-23 PROCEDURE — 1036F TOBACCO NON-USER: CPT | Performed by: PSYCHIATRY & NEUROLOGY

## 2018-02-23 PROCEDURE — 4040F PNEUMOC VAC/ADMIN/RCVD: CPT | Performed by: PSYCHIATRY & NEUROLOGY

## 2018-02-23 PROCEDURE — 99204 OFFICE O/P NEW MOD 45 MIN: CPT | Performed by: PSYCHIATRY & NEUROLOGY

## 2018-02-23 PROCEDURE — G8484 FLU IMMUNIZE NO ADMIN: HCPCS | Performed by: PSYCHIATRY & NEUROLOGY

## 2018-02-23 PROCEDURE — G8427 DOCREV CUR MEDS BY ELIG CLIN: HCPCS | Performed by: PSYCHIATRY & NEUROLOGY

## 2018-02-23 PROCEDURE — 1123F ACP DISCUSS/DSCN MKR DOCD: CPT | Performed by: PSYCHIATRY & NEUROLOGY

## 2018-02-23 PROCEDURE — G8420 CALC BMI NORM PARAMETERS: HCPCS | Performed by: PSYCHIATRY & NEUROLOGY

## 2018-02-23 RX ORDER — ROPINIROLE 0.5 MG/1
TABLET, FILM COATED ORAL
Qty: 90 TABLET | Refills: 3 | Status: SHIPPED | OUTPATIENT
Start: 2018-02-23 | End: 2018-02-23 | Stop reason: CLARIF

## 2018-02-23 RX ORDER — DONEPEZIL HYDROCHLORIDE 5 MG/1
5 TABLET, FILM COATED ORAL NIGHTLY
Qty: 30 TABLET | Refills: 3 | Status: SHIPPED | OUTPATIENT
Start: 2018-02-23 | End: 2018-10-22 | Stop reason: SDUPTHER

## 2018-02-23 NOTE — PROGRESS NOTES
infarction) ([facility] [address]    Parkinson's disease ([facility] [address]    Generalized weakness    [facility] dual pacemaker    Anxiety    Poor appetite    Ataxic gait    S/P CABG x 3    Cardiomyopathy (HCC) ejection 35 % 40%     Gastroesophageal reflux disease       Allergies   Allergen Reactions    Pcn [Penicillins] Hives    Terramycin [Oxytetracycline]        Current Outpatient Prescriptions   Medication Sig Dispense Refill    rOPINIRole (REQUIP) 0.5 MG tablet Requip 0.25 mg oral three times a day for 10 days then 0.5 mg three times a day thereafter.  90 tablet 3    isosorbide mononitrate (IMDUR) 30 MG extended release tablet Take 1 tablet by mouth daily 90 tablet 1    lisinopril (PRINIVIL;ZESTRIL) 5 MG tablet Take 1 tablet by mouth daily 90 tablet 1    clopidogrel (PLAVIX) 75 MG tablet Take 1 tablet by mouth daily 90 tablet 1    carvedilol (COREG) 3.125 MG tablet Take 1 tablet by mouth 2 times daily (with meals) 180 tablet 1    aspirin 81 MG tablet Take 1 tablet by mouth daily 30 tablet 11    nitroGLYCERIN (NITROSTAT) 0.4 MG SL tablet Place 1 tablet under the tongue every 5 minutes as needed for Chest pain 25 tablet 6    celecoxib (CELEBREX) 100 MG capsule Take 1 capsule by mouth 2 times daily 60 capsule 3    gabapentin (NEURONTIN) 100 MG capsule Take 1 capsule by mouth 3 times daily 90 capsule 6    omeprazole (PRILOSEC) 20 MG delayed release capsule Take 1 capsule by mouth daily 30 capsule 6    PARoxetine (PAXIL) 30 MG tablet Take 1 tablet by mouth every morning 30 tablet 6    meclizine (ANTIVERT) 12.5 MG tablet Take 1 tablet by mouth 3 times daily as needed for Dizziness 90 tablet 6    docusate (COLACE, DULCOLAX) 100 MG CAPS Take 100 mg by mouth 2 times daily 180 capsule 3    tamsulosin (FLOMAX) 0.4 MG capsule Take 1 capsule by mouth daily 30 capsule 11    Calcium Carbonate Antacid (TUMS PO) Take by mouth 4 times daily      traMADol (ULTRAM) 50 MG tablet Take 50 mg by mouth every 6 hours as needed for Pain      cognitive difficulty. He is pleasantly confused. Oriented to self only. He is circumstantial with his answers. Memory: abnormal - poor  Fund of Knowledge: abnormal - poor  Attention/Concentration: fair  Language: There is anomia noted on exam.. Mood is flat affect. Neck - supple, no significant adenopathy, carotids upstroke normal bilaterally,   There is no carotid bruit . No neck lymphadenopathy . No thyroid enlargement   Neurological -   Cranial [id number]:.   Cranial nerve II: Normal   Cranial nerve III: Pupils: equal, round, reactive to light  Cranial nerves III, IV, VI: Extraocular Movements: intact   Cranial nerve V: Facial sensation: intact   Cranial nerve VII:Facial strength: intact   Cranial nerve VIII: Hearing: intact   Cranial nerve IX: Palate Elevation intact bilaterally  Cranial nerve XI: Shoulder shrug intact bilaterally  Cranial nerve XII: Tongue midline   neck supple without rigidity  DTR's are decreased distal and symmetric  Babinski sign negative  Romberg unable to performed  Motor exam is 5/5 in the upper and lower extremities. Normal muscle tone . There is no muscle atrophy. Sensory is intact for light touch, cortical sensation   Coordination: finger to nose,  intact  Gait and station not tested, in wheel chair  There is no resting tremor, no pinrolling, there is no bradykinesia, there is no Hypohonia, there is normal blink rate. Abnormal movement none, vibration normal, proprioception normal  Skin - normal coloration, no rashes, no suspicious skin lesions  Superficial temporal artery pulses are normal.   There is no limitation of range of motion of the neck. Musculoskeletal: Has no hand arthritis, no limitation of ROM in any of the four extremities. There is no leg edema . The Heart was regular in rate and rhythm. No heart murmur  Chest Clear  Abdomen was soft.          We reviewed the patient records from referring provider and available information in the EHR       CT head

## 2018-02-27 ENCOUNTER — TELEPHONE (OUTPATIENT)
Dept: CARDIOLOGY CLINIC | Age: 83
End: 2018-02-27

## 2018-02-27 NOTE — TELEPHONE ENCOUNTER
Pt's son Onur Benedict called and states that last night pt had chest pain that radiated across his chest and went into both arms. They gave pt nitro and asa and it was relieved in 5 minutes. States that he is doing ok today. Advised if it happens again to take pt into the ER. Please advise.

## 2018-03-13 ENCOUNTER — HOSPITAL ENCOUNTER (OUTPATIENT)
Dept: NEUROLOGY | Age: 83
Discharge: HOME | End: 2018-03-13
Payer: MEDICARE

## 2018-03-13 PROCEDURE — 95819 EEG AWAKE AND ASLEEP: CPT

## 2018-04-10 ENCOUNTER — TELEPHONE (OUTPATIENT)
Dept: FAMILY MEDICINE CLINIC | Age: 83
End: 2018-04-10

## 2018-05-02 ENCOUNTER — TELEPHONE (OUTPATIENT)
Dept: FAMILY MEDICINE CLINIC | Age: 83
End: 2018-05-02

## 2018-05-15 ENCOUNTER — OFFICE VISIT (OUTPATIENT)
Dept: FAMILY MEDICINE CLINIC | Age: 83
End: 2018-05-15
Payer: MEDICARE

## 2018-05-15 VITALS
SYSTOLIC BLOOD PRESSURE: 162 MMHG | BODY MASS INDEX: 20.8 KG/M2 | TEMPERATURE: 98.6 F | DIASTOLIC BLOOD PRESSURE: 80 MMHG | WEIGHT: 136.8 LBS | HEART RATE: 68 BPM | RESPIRATION RATE: 24 BRPM

## 2018-05-15 DIAGNOSIS — F02.818 LATE ONSET ALZHEIMER'S DISEASE WITH BEHAVIORAL DISTURBANCE (HCC): ICD-10-CM

## 2018-05-15 DIAGNOSIS — R39.198 DIFFICULTY IN URINATION: ICD-10-CM

## 2018-05-15 DIAGNOSIS — R63.0 DECREASED APPETITE: ICD-10-CM

## 2018-05-15 DIAGNOSIS — F41.9 ANXIETY: ICD-10-CM

## 2018-05-15 DIAGNOSIS — R39.12 POOR URINARY STREAM: ICD-10-CM

## 2018-05-15 DIAGNOSIS — R63.4 WEIGHT LOSS: ICD-10-CM

## 2018-05-15 DIAGNOSIS — G30.1 LATE ONSET ALZHEIMER'S DISEASE WITH BEHAVIORAL DISTURBANCE (HCC): ICD-10-CM

## 2018-05-15 DIAGNOSIS — K59.00 CONSTIPATION, UNSPECIFIED CONSTIPATION TYPE: Primary | ICD-10-CM

## 2018-05-15 DIAGNOSIS — K21.9 GASTROESOPHAGEAL REFLUX DISEASE, ESOPHAGITIS PRESENCE NOT SPECIFIED: ICD-10-CM

## 2018-05-15 DIAGNOSIS — M25.552 LEFT HIP PAIN: ICD-10-CM

## 2018-05-15 DIAGNOSIS — R39.12 WEAK URINE STREAM: ICD-10-CM

## 2018-05-15 PROCEDURE — 1123F ACP DISCUSS/DSCN MKR DOCD: CPT | Performed by: NURSE PRACTITIONER

## 2018-05-15 PROCEDURE — G8420 CALC BMI NORM PARAMETERS: HCPCS | Performed by: NURSE PRACTITIONER

## 2018-05-15 PROCEDURE — 99214 OFFICE O/P EST MOD 30 MIN: CPT | Performed by: NURSE PRACTITIONER

## 2018-05-15 PROCEDURE — 1036F TOBACCO NON-USER: CPT | Performed by: NURSE PRACTITIONER

## 2018-05-15 PROCEDURE — G8598 ASA/ANTIPLAT THER USED: HCPCS | Performed by: NURSE PRACTITIONER

## 2018-05-15 PROCEDURE — 4040F PNEUMOC VAC/ADMIN/RCVD: CPT | Performed by: NURSE PRACTITIONER

## 2018-05-15 PROCEDURE — G8427 DOCREV CUR MEDS BY ELIG CLIN: HCPCS | Performed by: NURSE PRACTITIONER

## 2018-05-15 RX ORDER — PAROXETINE 30 MG/1
30 TABLET, FILM COATED ORAL EVERY MORNING
Qty: 30 TABLET | Refills: 6 | Status: SHIPPED | OUTPATIENT
Start: 2018-05-15 | End: 2018-11-19 | Stop reason: SDUPTHER

## 2018-05-15 RX ORDER — MECLIZINE HCL 12.5 MG/1
12.5 TABLET ORAL 3 TIMES DAILY PRN
Qty: 90 TABLET | Refills: 6 | Status: SHIPPED | OUTPATIENT
Start: 2018-05-15 | End: 2018-11-19 | Stop reason: SDUPTHER

## 2018-05-15 RX ORDER — OMEPRAZOLE 20 MG/1
20 CAPSULE, DELAYED RELEASE ORAL DAILY
Qty: 30 CAPSULE | Refills: 6 | Status: SHIPPED | OUTPATIENT
Start: 2018-05-15 | End: 2018-11-19 | Stop reason: SDUPTHER

## 2018-05-15 RX ORDER — GABAPENTIN 100 MG/1
100 CAPSULE ORAL 3 TIMES DAILY
Qty: 90 CAPSULE | Refills: 6 | Status: SHIPPED | OUTPATIENT
Start: 2018-05-15 | End: 2018-11-19 | Stop reason: SDUPTHER

## 2018-05-15 RX ORDER — POLYETHYLENE GLYCOL 3350 17 G/17G
17 POWDER, FOR SOLUTION ORAL DAILY
Qty: 510 G | Refills: 3 | Status: SHIPPED | OUTPATIENT
Start: 2018-05-15 | End: 2018-06-14

## 2018-05-15 RX ORDER — CELECOXIB 100 MG/1
100 CAPSULE ORAL 2 TIMES DAILY
Qty: 60 CAPSULE | Refills: 6 | Status: ON HOLD | OUTPATIENT
Start: 2018-05-15 | End: 2019-03-26 | Stop reason: HOSPADM

## 2018-05-15 ASSESSMENT — ENCOUNTER SYMPTOMS
CONSTIPATION: 1
DIARRHEA: 0
NAUSEA: 1
SINUS PAIN: 0
VOMITING: 0
RESPIRATORY NEGATIVE: 1
SINUS PRESSURE: 0
ABDOMINAL PAIN: 0
ABDOMINAL DISTENTION: 0

## 2018-09-18 ENCOUNTER — OFFICE VISIT (OUTPATIENT)
Dept: CARDIOLOGY CLINIC | Age: 83
End: 2018-09-18
Payer: MEDICARE

## 2018-09-18 VITALS — SYSTOLIC BLOOD PRESSURE: 153 MMHG | HEART RATE: 70 BPM | DIASTOLIC BLOOD PRESSURE: 75 MMHG

## 2018-09-18 DIAGNOSIS — I10 HYPERTENSION, UNSPECIFIED TYPE: ICD-10-CM

## 2018-09-18 DIAGNOSIS — I10 ESSENTIAL HYPERTENSION: ICD-10-CM

## 2018-09-18 DIAGNOSIS — E78.5 HYPERLIPIDEMIA, UNSPECIFIED HYPERLIPIDEMIA TYPE: ICD-10-CM

## 2018-09-18 DIAGNOSIS — Z95.1 S/P CABG X 3: ICD-10-CM

## 2018-09-18 DIAGNOSIS — Z95.0 PACEMAKER: ICD-10-CM

## 2018-09-18 DIAGNOSIS — I25.5 ISCHEMIC CARDIOMYOPATHY: ICD-10-CM

## 2018-09-18 DIAGNOSIS — I48.0 PAROXYSMAL ATRIAL FIBRILLATION (HCC): ICD-10-CM

## 2018-09-18 DIAGNOSIS — I25.10 CORONARY ARTERY DISEASE INVOLVING NATIVE CORONARY ARTERY OF NATIVE HEART WITHOUT ANGINA PECTORIS: ICD-10-CM

## 2018-09-18 DIAGNOSIS — I21.4 NSTEMI (NON-ST ELEVATED MYOCARDIAL INFARCTION) (HCC): Primary | ICD-10-CM

## 2018-09-18 PROCEDURE — 4040F PNEUMOC VAC/ADMIN/RCVD: CPT | Performed by: NURSE PRACTITIONER

## 2018-09-18 PROCEDURE — 1123F ACP DISCUSS/DSCN MKR DOCD: CPT | Performed by: NURSE PRACTITIONER

## 2018-09-18 PROCEDURE — G8420 CALC BMI NORM PARAMETERS: HCPCS | Performed by: NURSE PRACTITIONER

## 2018-09-18 PROCEDURE — G8427 DOCREV CUR MEDS BY ELIG CLIN: HCPCS | Performed by: NURSE PRACTITIONER

## 2018-09-18 PROCEDURE — 1036F TOBACCO NON-USER: CPT | Performed by: NURSE PRACTITIONER

## 2018-09-18 PROCEDURE — 99213 OFFICE O/P EST LOW 20 MIN: CPT | Performed by: NURSE PRACTITIONER

## 2018-09-18 PROCEDURE — 1101F PT FALLS ASSESS-DOCD LE1/YR: CPT | Performed by: NURSE PRACTITIONER

## 2018-09-18 PROCEDURE — 93000 ELECTROCARDIOGRAM COMPLETE: CPT | Performed by: NURSE PRACTITIONER

## 2018-09-18 PROCEDURE — G8598 ASA/ANTIPLAT THER USED: HCPCS | Performed by: NURSE PRACTITIONER

## 2018-09-18 RX ORDER — ISOSORBIDE MONONITRATE 30 MG/1
30 TABLET, EXTENDED RELEASE ORAL DAILY
Qty: 90 TABLET | Refills: 1 | Status: SHIPPED | OUTPATIENT
Start: 2018-09-18 | End: 2019-01-01 | Stop reason: SDUPTHER

## 2018-09-18 RX ORDER — LISINOPRIL 5 MG/1
5 TABLET ORAL DAILY
Qty: 90 TABLET | Refills: 1 | Status: ON HOLD | OUTPATIENT
Start: 2018-09-18 | End: 2019-03-26 | Stop reason: HOSPADM

## 2018-09-18 RX ORDER — CARVEDILOL 3.12 MG/1
3.12 TABLET ORAL 2 TIMES DAILY WITH MEALS
Qty: 180 TABLET | Refills: 1 | Status: SHIPPED | OUTPATIENT
Start: 2018-09-18 | End: 2019-01-01 | Stop reason: SDUPTHER

## 2018-09-18 NOTE — PROGRESS NOTES
tablet by mouth nightly 30 tablet 3    isosorbide mononitrate (IMDUR) 30 MG extended release tablet Take 1 tablet by mouth daily 90 tablet 1    lisinopril (PRINIVIL;ZESTRIL) 5 MG tablet Take 1 tablet by mouth daily 90 tablet 1    clopidogrel (PLAVIX) 75 MG tablet Take 1 tablet by mouth daily 90 tablet 1    carvedilol (COREG) 3.125 MG tablet Take 1 tablet by mouth 2 times daily (with meals) 180 tablet 1    aspirin 81 MG tablet Take 1 tablet by mouth daily 30 tablet 11    nitroGLYCERIN (NITROSTAT) 0.4 MG SL tablet Place 1 tablet under the tongue every 5 minutes as needed for Chest pain 25 tablet 6    docusate (COLACE, DULCOLAX) 100 MG CAPS Take 100 mg by mouth 2 times daily 180 capsule 3    tamsulosin (FLOMAX) 0.4 MG capsule Take 1 capsule by mouth daily 30 capsule 11    Calcium Carbonate Antacid (TUMS PO) Take by mouth 4 times daily      traMADol (ULTRAM) 50 MG tablet Take 50 mg by mouth every 6 hours as needed for Pain      Misc. Devices [facility]) MISC 1 applicator by Does not apply route continuous. 1 each 0    aluminum & magnesium hydroxide-simethicone (MAALOX ADVANCED) 200-200-20 MG/5ML SUSP suspension Take 5 mLs by mouth every 6 hours as needed for Indigestion. 1 Bottle 0    Cyanocobalamin (VITAMIN B-12 CR) 1000 MCG TBCR Take 1,000 mcg by mouth daily.  Cholecalciferol (VITAMIN D-3) 5000 UNITS TABS Take 1 tablet by mouth daily.  acetaminophen (TYLENOL) 325 MG tablet Take 2 tablets by mouth every 6 hours as needed for Pain (For mild pain level 1-3 or for fever > 100.5). 120 tablet 0    magnesium hydroxide (MILK OF MAGNESIA) 400 MG/5ML suspension Take 30 mLs by mouth daily as needed for Constipation.  multivitamin (THERAGRAN) per tablet Take 1 tablet by mouth daily. 90 tablet 0    gabapentin (NEURONTIN) 100 MG capsule Take 1 capsule by mouth 3 times daily for 92 days. . 90 capsule 6     No current facility-administered medications for this visit.         Social History     Social treatment plan  Follow up with Dr Prado Situ as scheduled or sooner if needed

## 2018-10-22 DIAGNOSIS — R41.3 MEMORY PROBLEM: Primary | ICD-10-CM

## 2018-10-22 RX ORDER — DONEPEZIL HYDROCHLORIDE 5 MG/1
TABLET, FILM COATED ORAL
Qty: 30 TABLET | Refills: 3 | Status: SHIPPED | OUTPATIENT
Start: 2018-10-22 | End: 2019-01-01 | Stop reason: SDUPTHER

## 2018-11-19 ENCOUNTER — OFFICE VISIT (OUTPATIENT)
Dept: FAMILY MEDICINE CLINIC | Age: 83
End: 2018-11-19

## 2018-11-19 VITALS
DIASTOLIC BLOOD PRESSURE: 82 MMHG | HEIGHT: 66 IN | RESPIRATION RATE: 14 BRPM | WEIGHT: 135.4 LBS | TEMPERATURE: 98.2 F | SYSTOLIC BLOOD PRESSURE: 130 MMHG | HEART RATE: 60 BPM | BODY MASS INDEX: 21.76 KG/M2

## 2018-11-19 DIAGNOSIS — Z23 IMMUNIZATION DUE: ICD-10-CM

## 2018-11-19 DIAGNOSIS — R07.9 CHEST PAIN, UNSPECIFIED TYPE: ICD-10-CM

## 2018-11-19 DIAGNOSIS — M25.552 LEFT HIP PAIN: ICD-10-CM

## 2018-11-19 DIAGNOSIS — F41.9 ANXIETY: ICD-10-CM

## 2018-11-19 DIAGNOSIS — Z23 NEEDS FLU SHOT: ICD-10-CM

## 2018-11-19 DIAGNOSIS — M54.32 SCIATICA OF LEFT SIDE: ICD-10-CM

## 2018-11-19 DIAGNOSIS — I42.9 CARDIOMYOPATHY, UNSPECIFIED TYPE (HCC): Primary | ICD-10-CM

## 2018-11-19 DIAGNOSIS — L57.0 KERATOSIS: ICD-10-CM

## 2018-11-19 DIAGNOSIS — D22.9 NEVUS: ICD-10-CM

## 2018-11-19 DIAGNOSIS — K21.9 GASTROESOPHAGEAL REFLUX DISEASE, ESOPHAGITIS PRESENCE NOT SPECIFIED: ICD-10-CM

## 2018-11-19 PROCEDURE — 90732 PPSV23 VACC 2 YRS+ SUBQ/IM: CPT | Performed by: NURSE PRACTITIONER

## 2018-11-19 PROCEDURE — 93000 ELECTROCARDIOGRAM COMPLETE: CPT | Performed by: NURSE PRACTITIONER

## 2018-11-19 PROCEDURE — 99215 OFFICE O/P EST HI 40 MIN: CPT | Performed by: NURSE PRACTITIONER

## 2018-11-19 PROCEDURE — 90662 IIV NO PRSV INCREASED AG IM: CPT | Performed by: NURSE PRACTITIONER

## 2018-11-19 PROCEDURE — 90471 IMMUNIZATION ADMIN: CPT | Performed by: NURSE PRACTITIONER

## 2018-11-19 PROCEDURE — 90472 IMMUNIZATION ADMIN EACH ADD: CPT | Performed by: NURSE PRACTITIONER

## 2018-11-19 RX ORDER — GABAPENTIN 100 MG/1
100 CAPSULE ORAL 3 TIMES DAILY
Qty: 90 CAPSULE | Refills: 0 | Status: ON HOLD | OUTPATIENT
Start: 2018-11-19 | End: 2019-03-26 | Stop reason: HOSPADM

## 2018-11-19 RX ORDER — PREDNISONE 20 MG/1
TABLET ORAL
Qty: 18 TABLET | Refills: 0 | Status: SHIPPED | OUTPATIENT
Start: 2018-11-19 | End: 2018-11-29

## 2018-11-19 RX ORDER — PAROXETINE 30 MG/1
30 TABLET, FILM COATED ORAL EVERY MORNING
Qty: 90 TABLET | Refills: 3 | Status: SHIPPED | OUTPATIENT
Start: 2018-11-19 | End: 2019-01-01

## 2018-11-19 RX ORDER — MECLIZINE HCL 12.5 MG/1
12.5 TABLET ORAL 3 TIMES DAILY PRN
Qty: 90 TABLET | Refills: 6 | Status: SHIPPED | OUTPATIENT
Start: 2018-11-19 | End: 2019-01-01 | Stop reason: SDUPTHER

## 2018-11-19 RX ORDER — OMEPRAZOLE 20 MG/1
20 CAPSULE, DELAYED RELEASE ORAL DAILY
Qty: 90 CAPSULE | Refills: 3 | Status: SHIPPED | OUTPATIENT
Start: 2018-11-19

## 2018-11-19 ASSESSMENT — ENCOUNTER SYMPTOMS
ABDOMINAL PAIN: 0
RESPIRATORY NEGATIVE: 1
SINUS PAIN: 0
ABDOMINAL DISTENTION: 0
NAUSEA: 0
BACK PAIN: 1
BLOOD IN STOOL: 0
CONSTIPATION: 0
COLOR CHANGE: 1
RHINORRHEA: 0
DIARRHEA: 0
VOMITING: 0
SINUS PRESSURE: 0

## 2018-11-19 ASSESSMENT — PATIENT HEALTH QUESTIONNAIRE - PHQ9
SUM OF ALL RESPONSES TO PHQ QUESTIONS 1-9: 0
SUM OF ALL RESPONSES TO PHQ9 QUESTIONS 1 & 2: 0
SUM OF ALL RESPONSES TO PHQ QUESTIONS 1-9: 0
1. LITTLE INTEREST OR PLEASURE IN DOING THINGS: 0
2. FEELING DOWN, DEPRESSED OR HOPELESS: 0

## 2018-11-19 NOTE — PROGRESS NOTES
PO) Take by mouth 4 times daily      traMADol (ULTRAM) 50 MG tablet Take 50 mg by mouth every 6 hours as needed for Pain      Misc. Devices [facility]) MISC 1 applicator by Does not apply route continuous. 1 each 0    aluminum & magnesium hydroxide-simethicone (MAALOX ADVANCED) 200-200-20 MG/5ML SUSP suspension Take 5 mLs by mouth every 6 hours as needed for Indigestion. 1 Bottle 0    Cyanocobalamin (VITAMIN B-12 CR) 1000 MCG TBCR Take 1,000 mcg by mouth daily.  Cholecalciferol (VITAMIN D-3) 5000 UNITS TABS Take 1 tablet by mouth daily.  acetaminophen (TYLENOL) 325 MG tablet Take 2 tablets by mouth every 6 hours as needed for Pain (For mild pain level 1-3 or for fever > 100.5). 120 tablet 0    magnesium hydroxide (MILK OF MAGNESIA) 400 MG/5ML suspension Take 30 mLs by mouth daily as needed for Constipation.  multivitamin (THERAGRAN) per tablet Take 1 tablet by mouth daily. 90 tablet 0     No current facility-administered medications for this visit.         Past Medical History:   Diagnosis Date    Alzheimer's dementia     Anxiety     Arthritis     Atrial fibrillation (HCC)     CAD (coronary artery disease)     Dementia     Depression     Fall at home     GERD (gastroesophageal reflux disease)     Heart attack ([address] 5/11/13    Hernia     Hyperlipidemia     Hypertension     Iron deficiency anemia     Irritable bowel syndrome     Pacemaker age 58    Parkinson disease ([address]     Parkinsons ([address]       Past Surgical History:   Procedure Laterality Date    BACK SURGERY  age [de-identified]     CARDIAC SURGERY  age 58    COLONOSCOPY  over ten years ago   [id number]  Patient unsure     PACEMAKER PLACEMENT  age 58, replaced age 80    VASCULAR SURGERY  age 58      Family History   Problem Relation Age of Onset    Heart Disease Father      Social History   Substance Use Topics    Smoking status: Former Smoker     Years: 3.00     Types: Cigars     Quit date: 10/11/2008    Smokeless tobacco:

## 2018-11-26 ENCOUNTER — TELEPHONE (OUTPATIENT)
Dept: FAMILY MEDICINE CLINIC | Age: 83
End: 2018-11-26

## 2019-01-01 ENCOUNTER — TELEPHONE (OUTPATIENT)
Dept: CARDIOLOGY CLINIC | Age: 84
End: 2019-01-01

## 2019-01-01 ENCOUNTER — OFFICE VISIT (OUTPATIENT)
Dept: FAMILY MEDICINE CLINIC | Age: 84
End: 2019-01-01

## 2019-01-01 ENCOUNTER — TELEPHONE (OUTPATIENT)
Dept: FAMILY MEDICINE CLINIC | Age: 84
End: 2019-01-01

## 2019-01-01 ENCOUNTER — HOSPITAL ENCOUNTER (OUTPATIENT)
Age: 84
Discharge: HOME | End: 2019-04-25

## 2019-01-01 ENCOUNTER — OFFICE VISIT (OUTPATIENT)
Dept: PSYCHIATRY | Age: 84
End: 2019-01-01
Payer: MEDICARE

## 2019-01-01 ENCOUNTER — HOSPITAL ENCOUNTER (EMERGENCY)
Age: 84
Discharge: HOME | End: 2019-05-16

## 2019-01-01 ENCOUNTER — HOSPITAL ENCOUNTER (INPATIENT)
Age: 84
LOS: 4 days | Discharge: HOME HEALTH | DRG: 312 | End: 2019-12-16
Attending: EMERGENCY MEDICINE | Admitting: FAMILY MEDICINE
Payer: MEDICARE

## 2019-01-01 ENCOUNTER — HOSPITAL ENCOUNTER (OUTPATIENT)
Dept: GENERAL RADIOLOGY | Age: 84
Discharge: HOME | End: 2019-04-25

## 2019-01-01 ENCOUNTER — NURSE TRIAGE (OUTPATIENT)
Dept: ADMINISTRATIVE | Age: 84
End: 2019-01-01

## 2019-01-01 ENCOUNTER — APPOINTMENT (OUTPATIENT)
Dept: ULTRASOUND IMAGING | Age: 84
DRG: 312 | End: 2019-01-01
Payer: MEDICARE

## 2019-01-01 ENCOUNTER — HOSPITAL ENCOUNTER (EMERGENCY)
Dept: GENERAL RADIOLOGY | Age: 84
Discharge: HOME | End: 2019-05-16

## 2019-01-01 ENCOUNTER — OFFICE VISIT (OUTPATIENT)
Dept: PSYCHIATRY | Age: 84
End: 2019-01-01

## 2019-01-01 ENCOUNTER — OFFICE VISIT (OUTPATIENT)
Dept: PHYSICAL MEDICINE AND REHAB | Age: 84
End: 2019-01-01

## 2019-01-01 ENCOUNTER — APPOINTMENT (OUTPATIENT)
Dept: CT IMAGING | Age: 84
DRG: 312 | End: 2019-01-01
Payer: MEDICARE

## 2019-01-01 ENCOUNTER — APPOINTMENT (OUTPATIENT)
Dept: GENERAL RADIOLOGY | Age: 84
DRG: 312 | End: 2019-01-01
Payer: MEDICARE

## 2019-01-01 ENCOUNTER — TELEPHONE (OUTPATIENT)
Dept: PSYCHIATRY | Age: 84
End: 2019-01-01

## 2019-01-01 VITALS
HEIGHT: 63 IN | BODY MASS INDEX: 22.15 KG/M2 | HEART RATE: 71 BPM | SYSTOLIC BLOOD PRESSURE: 138 MMHG | WEIGHT: 125 LBS | DIASTOLIC BLOOD PRESSURE: 70 MMHG

## 2019-01-01 VITALS
RESPIRATION RATE: 20 BRPM | OXYGEN SATURATION: 97 % | BODY MASS INDEX: 22.67 KG/M2 | HEART RATE: 72 BPM | DIASTOLIC BLOOD PRESSURE: 62 MMHG | SYSTOLIC BLOOD PRESSURE: 137 MMHG | WEIGHT: 128 LBS

## 2019-01-01 VITALS
TEMPERATURE: 97.4 F | DIASTOLIC BLOOD PRESSURE: 86 MMHG | HEIGHT: 66 IN | OXYGEN SATURATION: 96 % | BODY MASS INDEX: 20.83 KG/M2 | WEIGHT: 129.63 LBS | SYSTOLIC BLOOD PRESSURE: 160 MMHG | RESPIRATION RATE: 16 BRPM | HEART RATE: 75 BPM

## 2019-01-01 VITALS
SYSTOLIC BLOOD PRESSURE: 135 MMHG | WEIGHT: 137 LBS | BODY MASS INDEX: 22.02 KG/M2 | RESPIRATION RATE: 16 BRPM | HEIGHT: 66 IN | HEART RATE: 71 BPM | OXYGEN SATURATION: 96 % | DIASTOLIC BLOOD PRESSURE: 65 MMHG | TEMPERATURE: 99.1 F

## 2019-01-01 DIAGNOSIS — R41.3 MEMORY PROBLEM: ICD-10-CM

## 2019-01-01 DIAGNOSIS — R45.86 MOOD CHANGES: ICD-10-CM

## 2019-01-01 DIAGNOSIS — M25.511 ACUTE PAIN OF RIGHT SHOULDER: ICD-10-CM

## 2019-01-01 DIAGNOSIS — F05 SUNDOWNING: ICD-10-CM

## 2019-01-01 DIAGNOSIS — F32.A DEPRESSION, UNSPECIFIED DEPRESSION TYPE: ICD-10-CM

## 2019-01-01 DIAGNOSIS — I10 HYPERTENSION, UNSPECIFIED TYPE: ICD-10-CM
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
## 2019-01-01 DIAGNOSIS — G89.4 CHRONIC PAIN SYNDROME: ICD-10-CM

## 2019-01-01 DIAGNOSIS — S51.811A SKIN TEAR OF RIGHT FOREARM WITHOUT COMPLICATION, INITIAL ENCOUNTER: ICD-10-CM

## 2019-01-01 DIAGNOSIS — M16.12 PRIMARY OSTEOARTHRITIS OF LEFT HIP: Primary | ICD-10-CM

## 2019-01-01 DIAGNOSIS — I25.10 CORONARY ARTERY DISEASE INVOLVING NATIVE CORONARY ARTERY OF NATIVE HEART WITHOUT ANGINA PECTORIS: ICD-10-CM

## 2019-01-01 DIAGNOSIS — M25.511 ACUTE PAIN OF RIGHT SHOULDER: Primary | ICD-10-CM

## 2019-01-01 DIAGNOSIS — F03.91 DEMENTIA WITH BEHAVIORAL DISTURBANCE, UNSPECIFIED DEMENTIA TYPE: Primary | ICD-10-CM

## 2019-01-01 DIAGNOSIS — E87.6 HYPOKALEMIA: Primary | ICD-10-CM

## 2019-01-01 DIAGNOSIS — R44.3 HALLUCINATIONS: ICD-10-CM

## 2019-01-01 DIAGNOSIS — N40.0 BENIGN PROSTATIC HYPERPLASIA: ICD-10-CM

## 2019-01-01 DIAGNOSIS — S00.33XA CONTUSION OF NOSE, INITIAL ENCOUNTER: Primary | ICD-10-CM

## 2019-01-01 LAB
ACINETOBACTER BAUMANNII FILM ARRAY: NOT DETECTED
ALBUMIN SERPL-MCNC: 3.3 G/DL (ref 3.5–5.1)
ALP BLD-CCNC: 75 U/L (ref 38–126)
ALT SERPL-CCNC: < 5 U/L (ref 11–66)
AMPHETAMINE+METHAMPHETAMINE URINE SCREEN: NEGATIVE
ANION GAP SERPL CALCULATED.3IONS-SCNC: 11 MEQ/L (ref 8–16)
ANION GAP SERPL CALCULATED.3IONS-SCNC: 11 MEQ/L (ref 8–16)
ANION GAP SERPL CALCULATED.3IONS-SCNC: 18 MEQ/L (ref 8–16)
ANION GAP SERPL CALCULATED.3IONS-SCNC: 8 MEQ/L (ref 8–16)
AST SERPL-CCNC: 12 U/L (ref 5–40)
BACTERIA: ABNORMAL /HPF
BARBITURATE QUANTITATIVE URINE: NEGATIVE
BASOPHILS # BLD: 0.2 %
BASOPHILS ABSOLUTE: 0 THOU/MM3 (ref 0–0.1)
BENZODIAZEPINE QUANTITATIVE URINE: NEGATIVE
BILIRUB SERPL-MCNC: 0.6 MG/DL (ref 0.3–1.2)
BILIRUBIN URINE: ABNORMAL
BILIRUBIN, POC: NORMAL
BLOOD CULTURE, ROUTINE: ABNORMAL
BLOOD CULTURE, ROUTINE: ABNORMAL
BLOOD CULTURE, ROUTINE: NORMAL
BLOOD URINE, POC: NEGATIVE
BLOOD, URINE: ABNORMAL
BOTTLE TYPE: NORMAL
BUN BLDV-MCNC: 13 MG/DL (ref 7–22)
BUN BLDV-MCNC: 18 MG/DL (ref 7–22)
BUN BLDV-MCNC: 18 MG/DL (ref 7–22)
BUN BLDV-MCNC: 9 MG/DL (ref 7–22)
CALCIUM IONIZED: 1.1 MMOL/L (ref 1.12–1.32)
CALCIUM SERPL-MCNC: 8.3 MG/DL (ref 8.5–10.5)
CALCIUM SERPL-MCNC: 8.6 MG/DL (ref 8.5–10.5)
CALCIUM SERPL-MCNC: 8.7 MG/DL (ref 8.5–10.5)
CALCIUM SERPL-MCNC: 9.6 MG/DL (ref 8.5–10.5)
CANDIDA ALBICANS FILM ARRAY: NOT DETECTED
CANDIDA GLABRATA FILM ARRAY: NOT DETECTED
CANDIDA KRUSEI FILM ARRAY: NOT DETECTED
CANDIDA PARAPSILOSIS FILM ARRAY: NOT DETECTED
CANDIDA TROPICALIS FILM ARRAY: NOT DETECTED
CANNABINOID QUANTITATIVE URINE: NEGATIVE
CARBAPENEM RESITANT FILM ARRAY: NORMAL
CASTS 2: ABNORMAL /LPF
CASTS UA: ABNORMAL /LPF
CHARACTER, URINE: ABNORMAL
CHLORIDE BLD-SCNC: 103 MEQ/L (ref 98–111)
CHLORIDE BLD-SCNC: 96 MEQ/L (ref 98–111)
CHLORIDE BLD-SCNC: 96 MEQ/L (ref 98–111)
CHLORIDE BLD-SCNC: 99 MEQ/L (ref 98–111)
CLARITY, POC: CLEAR
CO2: 24 MEQ/L (ref 23–33)
CO2: 26 MEQ/L (ref 23–33)
CO2: 28 MEQ/L (ref 23–33)
CO2: 29 MEQ/L (ref 23–33)
COCAINE METABOLITE QUANTITATIVE URINE: NEGATIVE
COLOR, POC: NORMAL
COLOR: ABNORMAL
CREAT SERPL-MCNC: 0.8 MG/DL (ref 0.4–1.2)
CREAT SERPL-MCNC: 0.9 MG/DL (ref 0.4–1.2)
CREAT SERPL-MCNC: 1.4 MG/DL (ref 0.4–1.2)
CREAT SERPL-MCNC: 1.4 MG/DL (ref 0.4–1.2)
CRYSTALS, UA: ABNORMAL
EKG ATRIAL RATE: 85 BPM
EKG P AXIS: 167 DEGREES
EKG P-R INTERVAL: 152 MS
EKG Q-T INTERVAL: 524 MS
EKG QRS DURATION: 186 MS
EKG QTC CALCULATION (BAZETT): 585 MS
EKG R AXIS: -117 DEGREES
EKG T AXIS: 81 DEGREES
EKG VENTRICULAR RATE: 75 BPM
ENTERBACTER CLOACAE FILM ARRAY: NOT DETECTED
ENTERBACTERIACEAE FILM ARRAY: NOT DETECTED
ENTEROCOCCUS FILM ARRAY: NOT DETECTED
EOSINOPHIL # BLD: 0.4 %
EOSINOPHILS ABSOLUTE: 0 THOU/MM3 (ref 0–0.4)
EPITHELIAL CELLS, UA: ABNORMAL /HPF
ERYTHROCYTE [DISTWIDTH] IN BLOOD BY AUTOMATED COUNT: 12.7 % (ref 11.5–14.5)
ERYTHROCYTE [DISTWIDTH] IN BLOOD BY AUTOMATED COUNT: 12.8 % (ref 11.5–14.5)
ERYTHROCYTE [DISTWIDTH] IN BLOOD BY AUTOMATED COUNT: 12.9 % (ref 11.5–14.5)
ERYTHROCYTE [DISTWIDTH] IN BLOOD BY AUTOMATED COUNT: 13 % (ref 11.5–14.5)
ERYTHROCYTE [DISTWIDTH] IN BLOOD BY AUTOMATED COUNT: 45.1 FL (ref 35–45)
ERYTHROCYTE [DISTWIDTH] IN BLOOD BY AUTOMATED COUNT: 46.5 FL (ref 35–45)
ERYTHROCYTE [DISTWIDTH] IN BLOOD BY AUTOMATED COUNT: 48.3 FL (ref 35–45)
ERYTHROCYTE [DISTWIDTH] IN BLOOD BY AUTOMATED COUNT: 49.8 FL (ref 35–45)
ESCHERICHIA COLI FILM ARRAY: NOT DETECTED
FOLATE: 4.4 NG/ML (ref 4.8–24.2)
GFR SERPL CREATININE-BSD FRML MDRD: 48 ML/MIN/1.73M2
GFR SERPL CREATININE-BSD FRML MDRD: 48 ML/MIN/1.73M2
GFR SERPL CREATININE-BSD FRML MDRD: 79 ML/MIN/1.73M2
GFR SERPL CREATININE-BSD FRML MDRD: > 90 ML/MIN/1.73M2
GLUCOSE BLD-MCNC: 105 MG/DL (ref 70–108)
GLUCOSE BLD-MCNC: 107 MG/DL (ref 70–108)
GLUCOSE BLD-MCNC: 108 MG/DL (ref 70–108)
GLUCOSE BLD-MCNC: 109 MG/DL (ref 70–108)
GLUCOSE BLD-MCNC: 112 MG/DL (ref 70–108)
GLUCOSE BLD-MCNC: 221 MG/DL (ref 70–108)
GLUCOSE BLD-MCNC: 76 MG/DL (ref 70–108)
GLUCOSE BLD-MCNC: 79 MG/DL (ref 70–108)
GLUCOSE BLD-MCNC: 79 MG/DL (ref 70–108)
GLUCOSE BLD-MCNC: 80 MG/DL (ref 70–108)
GLUCOSE BLD-MCNC: 83 MG/DL (ref 70–108)
GLUCOSE BLD-MCNC: 85 MG/DL (ref 70–108)
GLUCOSE BLD-MCNC: 88 MG/DL (ref 70–108)
GLUCOSE BLD-MCNC: 89 MG/DL (ref 70–108)
GLUCOSE BLD-MCNC: 90 MG/DL (ref 70–108)
GLUCOSE BLD-MCNC: 90 MG/DL (ref 70–108)
GLUCOSE BLD-MCNC: 91 MG/DL (ref 70–108)
GLUCOSE BLD-MCNC: 92 MG/DL (ref 70–108)
GLUCOSE BLD-MCNC: 93 MG/DL (ref 70–108)
GLUCOSE URINE, POC: NEGATIVE
GLUCOSE URINE: NEGATIVE MG/DL
HAEMOPHILUS INFLUENZA FILM ARRAY: NOT DETECTED
HCT VFR BLD CALC: 33.5 % (ref 42–52)
HCT VFR BLD CALC: 34 % (ref 42–52)
HCT VFR BLD CALC: 34.7 % (ref 42–52)
HCT VFR BLD CALC: 35.4 % (ref 42–52)
HCT VFR BLD CALC: 44.8 % (ref 42–52)
HEMOGLOBIN: 10.9 GM/DL (ref 14–18)
HEMOGLOBIN: 11.1 GM/DL (ref 14–18)
HEMOGLOBIN: 12 GM/DL (ref 14–18)
HEMOGLOBIN: 12.2 GM/DL (ref 14–18)
HEMOGLOBIN: 14.2 GM/DL (ref 14–18)
ICTOTEST: NEGATIVE
IMMATURE GRANS (ABS): 0.04 THOU/MM3 (ref 0–0.07)
IMMATURE GRANULOCYTES: 0.5 %
KETONES, POC: NORMAL
KETONES, URINE: ABNORMAL
KLEBSIELLA OXYTOCA FILM ARRAY: NOT DETECTED
KLEBSIELLA PNEUMONIAE FILM ARRAY: NOT DETECTED
LACTIC ACID: 1 MMOL/L (ref 0.5–2.2)
LACTIC ACID: < 0.2 MMOL/L (ref 0.5–2.2)
LEUKOCYTE EST, POC: NEGATIVE
LEUKOCYTE ESTERASE, URINE: ABNORMAL
LISTERIA MONOCYTOGENES FILM ARRAY: NOT DETECTED
LYMPHOCYTES # BLD: 6.2 %
LYMPHOCYTES ABSOLUTE: 0.5 THOU/MM3 (ref 1–4.8)
MAGNESIUM: 1.9 MG/DL (ref 1.6–2.4)
MCH RBC QN AUTO: 32.7 PG (ref 26–33)
MCH RBC QN AUTO: 32.9 PG (ref 26–33)
MCH RBC QN AUTO: 33.3 PG (ref 26–33)
MCH RBC QN AUTO: 33.8 PG (ref 26–33)
MCHC RBC AUTO-ENTMCNC: 31.7 GM/DL (ref 32.2–35.5)
MCHC RBC AUTO-ENTMCNC: 32.1 GM/DL (ref 32.2–35.5)
MCHC RBC AUTO-ENTMCNC: 33.1 GM/DL (ref 32.2–35.5)
MCHC RBC AUTO-ENTMCNC: 35.2 GM/DL (ref 32.2–35.5)
MCV RBC AUTO: 102.1 FL (ref 80–94)
MCV RBC AUTO: 104.9 FL (ref 80–94)
MCV RBC AUTO: 96.1 FL (ref 80–94)
MCV RBC AUTO: 99.4 FL (ref 80–94)
METHICILLIN RESISTANT FILM ARRAY: NORMAL
MISCELLANEOUS 2: ABNORMAL
MONOCYTES # BLD: 3.3 %
MONOCYTES ABSOLUTE: 0.3 THOU/MM3 (ref 0.4–1.3)
MRSA SCREEN RT-PCR: NEGATIVE
NEISSERIA MENIGITIDIS FILM ARRAY: NOT DETECTED
NITRITE, POC: NEGATIVE
NITRITE, URINE: POSITIVE
NUCLEATED RED BLOOD CELLS: 0 /100 WBC
OPIATES, URINE: NEGATIVE
ORGANISM: ABNORMAL
ORGANISM: ABNORMAL
OSMOLALITY CALCULATION: 288.1 MOSMOL/KG (ref 275–300)
OXYCODONE: NEGATIVE
PH UA: 5 (ref 5–9)
PH, POC: 5
PHENCYCLIDINE QUANTITATIVE URINE: NEGATIVE
PLATELET # BLD: 141 THOU/MM3 (ref 130–400)
PLATELET # BLD: 157 THOU/MM3 (ref 130–400)
PLATELET # BLD: 165 THOU/MM3 (ref 130–400)
PLATELET # BLD: 180 THOU/MM3 (ref 130–400)
PMV BLD AUTO: 10 FL (ref 9.4–12.4)
PMV BLD AUTO: 10.2 FL (ref 9.4–12.4)
PMV BLD AUTO: 9.8 FL (ref 9.4–12.4)
PMV BLD AUTO: 9.9 FL (ref 9.4–12.4)
POTASSIUM REFLEX MAGNESIUM: 2.7 MEQ/L (ref 3.5–5.2)
POTASSIUM SERPL-SCNC: 2.8 MEQ/L (ref 3.5–5.2)
POTASSIUM SERPL-SCNC: 3.1 MEQ/L (ref 3.5–5.2)
POTASSIUM SERPL-SCNC: 3.2 MEQ/L (ref 3.5–5.2)
POTASSIUM SERPL-SCNC: 3.3 MEQ/L (ref 3.5–5.2)
POTASSIUM SERPL-SCNC: 3.3 MEQ/L (ref 3.5–5.2)
POTASSIUM SERPL-SCNC: 4 MEQ/L (ref 3.5–5.2)
POTASSIUM SERPL-SCNC: 4.1 MEQ/L (ref 3.5–5.2)
PROTEIN UA: 30
PROTEIN, POC: NEGATIVE
PROTEUS FILM ARRAY: NOT DETECTED
PSEUDOMONAS AERUGINOSA FILM ARRAY: NOT DETECTED
RBC # BLD: 3.33 MILL/MM3 (ref 4.7–6.1)
RBC # BLD: 3.37 MILL/MM3 (ref 4.7–6.1)
RBC # BLD: 3.61 MILL/MM3 (ref 4.7–6.1)
RBC # BLD: 4.27 MILL/MM3 (ref 4.7–6.1)
RBC URINE: > 200 /HPF
REASON FOR REJECTION: NORMAL
REJECTED TEST: NORMAL
RENAL EPITHELIAL, UA: ABNORMAL
SEG NEUTROPHILS: 89.4 %
SEGMENTED NEUTROPHILS ABSOLUTE COUNT: 7.4 THOU/MM3 (ref 1.8–7.7)
SERRATIA MARCESCENS FILM ARRAY: NOT DETECTED
SODIUM BLD-SCNC: 135 MEQ/L (ref 135–145)
SODIUM BLD-SCNC: 135 MEQ/L (ref 135–145)
SODIUM BLD-SCNC: 139 MEQ/L (ref 135–145)
SODIUM BLD-SCNC: 140 MEQ/L (ref 135–145)
SOURCE OF BLOOD CULTURE: NORMAL
SPECIFIC GRAVITY, POC: 1.03
SPECIFIC GRAVITY, URINE: 1.02 (ref 1–1.03)
STAPH AUREUS FILM ARRAY: NOT DETECTED
STAPHYLOCOCCUS FILM ARRAY: NOT DETECTED
STREP AGALACTIAE FILM ARRAY: NOT DETECTED
STREP PNEUMONIAE FILM ARRAY: NOT DETECTED
STREP PYOCGENES FILM ARRAY: NOT DETECTED
STREPTOCOCCUS FILM ARRAY: NOT DETECTED
TOTAL CK: 36 U/L (ref 55–170)
TOTAL PROTEIN: 6.2 G/DL (ref 6.1–8)
TROPONIN T: 0.01 NG/ML
TROPONIN T: 0.02 NG/ML
TROPONIN T: < 0.01 NG/ML
URINE CULTURE REFLEX: ABNORMAL
UROBILINOGEN, POC: 0.2
UROBILINOGEN, URINE: 1 EU/DL (ref 0–1)
VANCOMYCIN RESISTANT FILM ARRAY: NORMAL
VITAMIN B-12: 230 PG/ML (ref 211–911)
WBC # BLD: 4.1 THOU/MM3 (ref 4.8–10.8)
WBC # BLD: 4.2 THOU/MM3 (ref 4.8–10.8)
WBC # BLD: 7 THOU/MM3 (ref 4.8–10.8)
WBC # BLD: 8.3 THOU/MM3 (ref 4.8–10.8)
WBC UA: ABNORMAL /HPF
YEAST: ABNORMAL

## 2019-01-01 PROCEDURE — 74178 CT ABD&PLV WO CNTR FLWD CNTR: CPT

## 2019-01-01 PROCEDURE — 99214 OFFICE O/P EST MOD 30 MIN: CPT | Performed by: FAMILY MEDICINE

## 2019-01-01 PROCEDURE — 73030 X-RAY EXAM OF SHOULDER: CPT

## 2019-01-01 PROCEDURE — 6370000000 HC RX 637 (ALT 250 FOR IP): Performed by: PHYSICIAN ASSISTANT

## 2019-01-01 PROCEDURE — 99232 SBSQ HOSP IP/OBS MODERATE 35: CPT | Performed by: NURSE PRACTITIONER

## 2019-01-01 PROCEDURE — 36415 COLL VENOUS BLD VENIPUNCTURE: CPT

## 2019-01-01 PROCEDURE — 81001 URINALYSIS AUTO W/SCOPE: CPT

## 2019-01-01 PROCEDURE — 80048 BASIC METABOLIC PNL TOTAL CA: CPT

## 2019-01-01 PROCEDURE — 1200000003 HC TELEMETRY R&B

## 2019-01-01 PROCEDURE — 84132 ASSAY OF SERUM POTASSIUM: CPT

## 2019-01-01 PROCEDURE — 99223 1ST HOSP IP/OBS HIGH 75: CPT | Performed by: PHYSICIAN ASSISTANT

## 2019-01-01 PROCEDURE — 6360000002 HC RX W HCPCS: Performed by: PHYSICIAN ASSISTANT

## 2019-01-01 PROCEDURE — 94760 N-INVAS EAR/PLS OXIMETRY 1: CPT

## 2019-01-01 PROCEDURE — 70160 X-RAY EXAM OF NASAL BONES: CPT

## 2019-01-01 PROCEDURE — 99238 HOSP IP/OBS DSCHRG MGMT 30/<: CPT | Performed by: PHYSICIAN ASSISTANT

## 2019-01-01 PROCEDURE — 2709999900 HC NON-CHARGEABLE SUPPLY

## 2019-01-01 PROCEDURE — 76770 US EXAM ABDO BACK WALL COMP: CPT

## 2019-01-01 PROCEDURE — 97166 OT EVAL MOD COMPLEX 45 MIN: CPT

## 2019-01-01 PROCEDURE — 85027 COMPLETE CBC AUTOMATED: CPT

## 2019-01-01 PROCEDURE — 2580000003 HC RX 258: Performed by: PHYSICIAN ASSISTANT

## 2019-01-01 PROCEDURE — 6360000002 HC RX W HCPCS: Performed by: NURSE PRACTITIONER

## 2019-01-01 PROCEDURE — 90715 TDAP VACCINE 7 YRS/> IM: CPT | Performed by: NURSE PRACTITIONER

## 2019-01-01 PROCEDURE — 6360000004 HC RX CONTRAST MEDICATION: Performed by: PHYSICIAN ASSISTANT

## 2019-01-01 PROCEDURE — 99214 OFFICE O/P EST MOD 30 MIN: CPT | Performed by: NURSE PRACTITIONER

## 2019-01-01 PROCEDURE — 97110 THERAPEUTIC EXERCISES: CPT

## 2019-01-01 PROCEDURE — 82607 VITAMIN B-12: CPT

## 2019-01-01 PROCEDURE — 99285 EMERGENCY DEPT VISIT HI MDM: CPT

## 2019-01-01 PROCEDURE — 80053 COMPREHEN METABOLIC PANEL: CPT

## 2019-01-01 PROCEDURE — 97162 PT EVAL MOD COMPLEX 30 MIN: CPT

## 2019-01-01 PROCEDURE — 99204 OFFICE O/P NEW MOD 45 MIN: CPT | Performed by: PAIN MEDICINE

## 2019-01-01 PROCEDURE — 97530 THERAPEUTIC ACTIVITIES: CPT

## 2019-01-01 PROCEDURE — 85014 HEMATOCRIT: CPT

## 2019-01-01 PROCEDURE — 93010 ELECTROCARDIOGRAM REPORT: CPT | Performed by: NUCLEAR MEDICINE

## 2019-01-01 PROCEDURE — 90471 IMMUNIZATION ADMIN: CPT | Performed by: NURSE PRACTITIONER

## 2019-01-01 PROCEDURE — 84484 ASSAY OF TROPONIN QUANT: CPT

## 2019-01-01 PROCEDURE — 12002 RPR S/N/AX/GEN/TRNK2.6-7.5CM: CPT

## 2019-01-01 PROCEDURE — 85018 HEMOGLOBIN: CPT

## 2019-01-01 PROCEDURE — 85025 COMPLETE CBC W/AUTO DIFF WBC: CPT

## 2019-01-01 PROCEDURE — 95816 EEG AWAKE AND DROWSY: CPT

## 2019-01-01 PROCEDURE — 88112 CYTOPATH CELL ENHANCE TECH: CPT

## 2019-01-01 PROCEDURE — 99223 1ST HOSP IP/OBS HIGH 75: CPT | Performed by: NUCLEAR MEDICINE

## 2019-01-01 PROCEDURE — 83605 ASSAY OF LACTIC ACID: CPT

## 2019-01-01 PROCEDURE — 87040 BLOOD CULTURE FOR BACTERIA: CPT

## 2019-01-01 PROCEDURE — 99233 SBSQ HOSP IP/OBS HIGH 50: CPT | Performed by: PHYSICIAN ASSISTANT

## 2019-01-01 PROCEDURE — 51798 US URINE CAPACITY MEASURE: CPT

## 2019-01-01 PROCEDURE — 82948 REAGENT STRIP/BLOOD GLUCOSE: CPT

## 2019-01-01 PROCEDURE — 83735 ASSAY OF MAGNESIUM: CPT

## 2019-01-01 PROCEDURE — 93005 ELECTROCARDIOGRAM TRACING: CPT | Performed by: EMERGENCY MEDICINE

## 2019-01-01 PROCEDURE — 73090 X-RAY EXAM OF FOREARM: CPT

## 2019-01-01 PROCEDURE — 87086 URINE CULTURE/COLONY COUNT: CPT

## 2019-01-01 PROCEDURE — 70450 CT HEAD/BRAIN W/O DYE: CPT

## 2019-01-01 PROCEDURE — 99253 IP/OBS CNSLTJ NEW/EST LOW 45: CPT | Performed by: NURSE PRACTITIONER

## 2019-01-01 PROCEDURE — 2580000003 HC RX 258: Performed by: EMERGENCY MEDICINE

## 2019-01-01 PROCEDURE — 82330 ASSAY OF CALCIUM: CPT

## 2019-01-01 PROCEDURE — 97116 GAIT TRAINING THERAPY: CPT

## 2019-01-01 PROCEDURE — 87641 MR-STAPH DNA AMP PROBE: CPT

## 2019-01-01 PROCEDURE — 82746 ASSAY OF FOLIC ACID SERUM: CPT

## 2019-01-01 PROCEDURE — 80307 DRUG TEST PRSMV CHEM ANLYZR: CPT

## 2019-01-01 PROCEDURE — 87801 DETECT AGNT MULT DNA AMPLI: CPT

## 2019-01-01 PROCEDURE — 99213 OFFICE O/P EST LOW 20 MIN: CPT

## 2019-01-01 PROCEDURE — 99213 OFFICE O/P EST LOW 20 MIN: CPT | Performed by: PSYCHIATRY & NEUROLOGY

## 2019-01-01 PROCEDURE — 82550 ASSAY OF CK (CPK): CPT

## 2019-01-01 PROCEDURE — 90792 PSYCH DIAG EVAL W/MED SRVCS: CPT | Performed by: PSYCHIATRY & NEUROLOGY

## 2019-01-01 PROCEDURE — 71045 X-RAY EXAM CHEST 1 VIEW: CPT

## 2019-01-01 PROCEDURE — 81002 URINALYSIS NONAUTO W/O SCOPE: CPT | Performed by: FAMILY MEDICINE

## 2019-01-01 RX ORDER — DONEPEZIL HYDROCHLORIDE 5 MG/1
TABLET, FILM COATED ORAL
Qty: 90 TABLET | Refills: 3 | Status: SHIPPED | OUTPATIENT
Start: 2019-01-01 | End: 2019-01-01 | Stop reason: SDUPTHER

## 2019-01-01 RX ORDER — TAMSULOSIN HYDROCHLORIDE 0.4 MG/1
0.4 CAPSULE ORAL DAILY
Qty: 30 CAPSULE | Refills: 11 | Status: SHIPPED | OUTPATIENT
Start: 2019-01-01 | End: 2020-01-01

## 2019-01-01 RX ORDER — MECLIZINE HCL 12.5 MG/1
12.5 TABLET ORAL 3 TIMES DAILY PRN
Qty: 90 TABLET | Refills: 6 | Status: SHIPPED | OUTPATIENT
Start: 2019-01-01

## 2019-01-01 RX ORDER — POTASSIUM CHLORIDE 7.45 MG/ML
10 INJECTION INTRAVENOUS
Status: COMPLETED | OUTPATIENT
Start: 2019-01-01 | End: 2019-01-01

## 2019-01-01 RX ORDER — ASPIRIN 81 MG/1
81 TABLET, CHEWABLE ORAL DAILY
Qty: 30 TABLET | Refills: 3 | Status: SHIPPED | OUTPATIENT
Start: 2019-01-01

## 2019-01-01 RX ORDER — LISINOPRIL 5 MG/1
5 TABLET ORAL DAILY
Status: DISCONTINUED | OUTPATIENT
Start: 2019-01-01 | End: 2019-01-01

## 2019-01-01 RX ORDER — LANOLIN ALCOHOL/MO/W.PET/CERES
3 CREAM (GRAM) TOPICAL NIGHTLY PRN
Status: DISCONTINUED | OUTPATIENT
Start: 2019-01-01 | End: 2019-01-01 | Stop reason: HOSPADM

## 2019-01-01 RX ORDER — QUETIAPINE FUMARATE 25 MG/1
TABLET, FILM COATED ORAL
Qty: 30 TABLET | Refills: 0 | Status: SHIPPED | OUTPATIENT
Start: 2019-01-01 | End: 2019-01-01 | Stop reason: SDUPTHER

## 2019-01-01 RX ORDER — POTASSIUM CHLORIDE 7.45 MG/ML
10 INJECTION INTRAVENOUS ONCE
Status: COMPLETED | OUTPATIENT
Start: 2019-01-01 | End: 2019-01-01

## 2019-01-01 RX ORDER — TAMSULOSIN HYDROCHLORIDE 0.4 MG/1
0.4 CAPSULE ORAL DAILY
Status: DISCONTINUED | OUTPATIENT
Start: 2019-01-01 | End: 2019-01-01 | Stop reason: HOSPADM

## 2019-01-01 RX ORDER — LANOLIN ALCOHOL/MO/W.PET/CERES
3 CREAM (GRAM) TOPICAL NIGHTLY PRN
Qty: 30 TABLET | Refills: 3 | Status: SHIPPED | OUTPATIENT
Start: 2019-01-01

## 2019-01-01 RX ORDER — POTASSIUM CHLORIDE 20 MEQ/1
40 TABLET, EXTENDED RELEASE ORAL ONCE
Status: COMPLETED | OUTPATIENT
Start: 2019-01-01 | End: 2019-01-01

## 2019-01-01 RX ORDER — LISINOPRIL 5 MG/1
5 TABLET ORAL DAILY
Qty: 90 TABLET | Refills: 1 | Status: SHIPPED | OUTPATIENT
Start: 2019-01-01 | End: 2019-01-01 | Stop reason: SDUPTHER

## 2019-01-01 RX ORDER — QUETIAPINE FUMARATE 25 MG/1
TABLET, FILM COATED ORAL
Qty: 30 TABLET | Refills: 0 | Status: SHIPPED | OUTPATIENT
Start: 2019-01-01

## 2019-01-01 RX ORDER — QUETIAPINE FUMARATE 25 MG/1
25 TABLET, FILM COATED ORAL NIGHTLY PRN
Status: DISCONTINUED | OUTPATIENT
Start: 2019-01-01 | End: 2019-01-01

## 2019-01-01 RX ORDER — ONDANSETRON 2 MG/ML
4 INJECTION INTRAMUSCULAR; INTRAVENOUS EVERY 6 HOURS PRN
Status: DISCONTINUED | OUTPATIENT
Start: 2019-01-01 | End: 2019-01-01 | Stop reason: HOSPADM

## 2019-01-01 RX ORDER — LISINOPRIL 10 MG/1
10 TABLET ORAL DAILY
Qty: 30 TABLET | Refills: 3 | Status: SHIPPED | OUTPATIENT
Start: 2019-01-01

## 2019-01-01 RX ORDER — ASPIRIN 81 MG/1
81 TABLET, CHEWABLE ORAL DAILY
Status: DISCONTINUED | OUTPATIENT
Start: 2019-01-01 | End: 2019-01-01 | Stop reason: HOSPADM

## 2019-01-01 RX ORDER — ATORVASTATIN CALCIUM 40 MG/1
40 TABLET, FILM COATED ORAL DAILY
Qty: 90 TABLET | Refills: 3 | Status: SHIPPED | OUTPATIENT
Start: 2019-01-01 | End: 2019-01-01 | Stop reason: SDUPTHER

## 2019-01-01 RX ORDER — MECLIZINE HCL 12.5 MG/1
12.5 TABLET ORAL 3 TIMES DAILY PRN
Status: DISCONTINUED | OUTPATIENT
Start: 2019-01-01 | End: 2019-01-01 | Stop reason: HOSPADM

## 2019-01-01 RX ORDER — LISINOPRIL 5 MG/1
5 TABLET ORAL ONCE
Status: COMPLETED | OUTPATIENT
Start: 2019-01-01 | End: 2019-01-01

## 2019-01-01 RX ORDER — LISINOPRIL 5 MG/1
TABLET ORAL
Qty: 90 TABLET | Refills: 1 | Status: SHIPPED | OUTPATIENT
Start: 2019-01-01

## 2019-01-01 RX ORDER — SODIUM CHLORIDE 0.9 % (FLUSH) 0.9 %
10 SYRINGE (ML) INJECTION EVERY 12 HOURS SCHEDULED
Status: DISCONTINUED | OUTPATIENT
Start: 2019-01-01 | End: 2019-01-01 | Stop reason: HOSPADM

## 2019-01-01 RX ORDER — NICOTINE POLACRILEX 4 MG
15 LOZENGE BUCCAL PRN
Status: DISCONTINUED | OUTPATIENT
Start: 2019-01-01 | End: 2019-01-01 | Stop reason: HOSPADM

## 2019-01-01 RX ORDER — DEXTROSE MONOHYDRATE 50 MG/ML
100 INJECTION, SOLUTION INTRAVENOUS PRN
Status: DISCONTINUED | OUTPATIENT
Start: 2019-01-01 | End: 2019-01-01 | Stop reason: HOSPADM

## 2019-01-01 RX ORDER — LISINOPRIL 10 MG/1
10 TABLET ORAL DAILY
Status: DISCONTINUED | OUTPATIENT
Start: 2019-01-01 | End: 2019-01-01 | Stop reason: HOSPADM

## 2019-01-01 RX ORDER — PANTOPRAZOLE SODIUM 40 MG/1
40 TABLET, DELAYED RELEASE ORAL
Status: DISCONTINUED | OUTPATIENT
Start: 2019-01-01 | End: 2019-01-01 | Stop reason: HOSPADM

## 2019-01-01 RX ORDER — ISOSORBIDE MONONITRATE 30 MG/1
30 TABLET, EXTENDED RELEASE ORAL DAILY
Qty: 90 TABLET | Refills: 3 | Status: SHIPPED | OUTPATIENT
Start: 2019-01-01 | End: 2020-01-01

## 2019-01-01 RX ORDER — 0.9 % SODIUM CHLORIDE 0.9 %
500 INTRAVENOUS SOLUTION INTRAVENOUS ONCE
Status: COMPLETED | OUTPATIENT
Start: 2019-01-01 | End: 2019-01-01

## 2019-01-01 RX ORDER — ACETAMINOPHEN 325 MG/1
650 TABLET ORAL EVERY 4 HOURS PRN
Status: DISCONTINUED | OUTPATIENT
Start: 2019-01-01 | End: 2019-01-01 | Stop reason: HOSPADM

## 2019-01-01 RX ORDER — POTASSIUM CHLORIDE 20 MEQ/1
40 TABLET, EXTENDED RELEASE ORAL 2 TIMES DAILY
Qty: 8 TABLET | Refills: 0 | Status: SHIPPED | OUTPATIENT
Start: 2019-01-01 | End: 2019-01-01

## 2019-01-01 RX ORDER — CARVEDILOL 3.12 MG/1
3.12 TABLET ORAL 2 TIMES DAILY WITH MEALS
Status: DISCONTINUED | OUTPATIENT
Start: 2019-01-01 | End: 2019-01-01 | Stop reason: HOSPADM

## 2019-01-01 RX ORDER — CLOPIDOGREL BISULFATE 75 MG/1
75 TABLET ORAL DAILY
Status: DISCONTINUED | OUTPATIENT
Start: 2019-01-01 | End: 2019-01-01 | Stop reason: HOSPADM

## 2019-01-01 RX ORDER — QUETIAPINE FUMARATE 25 MG/1
25 TABLET, FILM COATED ORAL NIGHTLY PRN
Status: DISCONTINUED | OUTPATIENT
Start: 2019-01-01 | End: 2019-01-01 | Stop reason: HOSPADM

## 2019-01-01 RX ORDER — CEFDINIR 300 MG/1
300 CAPSULE ORAL EVERY 12 HOURS SCHEDULED
Status: DISCONTINUED | OUTPATIENT
Start: 2019-01-01 | End: 2019-01-01 | Stop reason: HOSPADM

## 2019-01-01 RX ORDER — CARVEDILOL 3.12 MG/1
3.12 TABLET ORAL 2 TIMES DAILY WITH MEALS
Qty: 180 TABLET | Refills: 3 | Status: SHIPPED | OUTPATIENT
Start: 2019-01-01 | End: 2019-01-01 | Stop reason: SDUPTHER

## 2019-01-01 RX ORDER — SODIUM CHLORIDE 9 MG/ML
INJECTION, SOLUTION INTRAVENOUS CONTINUOUS
Status: DISCONTINUED | OUTPATIENT
Start: 2019-01-01 | End: 2019-01-01

## 2019-01-01 RX ORDER — DEXTROSE MONOHYDRATE 25 G/50ML
12.5 INJECTION, SOLUTION INTRAVENOUS PRN
Status: DISCONTINUED | OUTPATIENT
Start: 2019-01-01 | End: 2019-01-01 | Stop reason: HOSPADM

## 2019-01-01 RX ORDER — LISINOPRIL 5 MG/1
TABLET ORAL
Qty: 90 TABLET | Refills: 1 | Status: ON HOLD | OUTPATIENT
Start: 2019-01-01 | End: 2019-01-01 | Stop reason: HOSPADM

## 2019-01-01 RX ORDER — ISOSORBIDE MONONITRATE 30 MG/1
30 TABLET, EXTENDED RELEASE ORAL DAILY
Status: DISCONTINUED | OUTPATIENT
Start: 2019-01-01 | End: 2019-01-01 | Stop reason: HOSPADM

## 2019-01-01 RX ORDER — PAROXETINE HYDROCHLORIDE 40 MG/1
40 TABLET, FILM COATED ORAL EVERY MORNING
Qty: 30 TABLET | Refills: 1 | Status: SHIPPED | OUTPATIENT
Start: 2019-01-01

## 2019-01-01 RX ORDER — CLOPIDOGREL BISULFATE 75 MG/1
75 TABLET ORAL DAILY
Qty: 90 TABLET | Refills: 3 | Status: SHIPPED | OUTPATIENT
Start: 2019-01-01

## 2019-01-01 RX ORDER — HYDRALAZINE HYDROCHLORIDE 20 MG/ML
5 INJECTION INTRAMUSCULAR; INTRAVENOUS EVERY 6 HOURS PRN
Status: DISCONTINUED | OUTPATIENT
Start: 2019-01-01 | End: 2019-01-01 | Stop reason: HOSPADM

## 2019-01-01 RX ORDER — DONEPEZIL HYDROCHLORIDE 5 MG/1
5 TABLET, FILM COATED ORAL NIGHTLY
Status: DISCONTINUED | OUTPATIENT
Start: 2019-01-01 | End: 2019-01-01 | Stop reason: HOSPADM

## 2019-01-01 RX ORDER — SODIUM CHLORIDE 0.9 % (FLUSH) 0.9 %
10 SYRINGE (ML) INJECTION PRN
Status: DISCONTINUED | OUTPATIENT
Start: 2019-01-01 | End: 2019-01-01 | Stop reason: HOSPADM

## 2019-01-01 RX ORDER — DONEPEZIL HYDROCHLORIDE 5 MG/1
TABLET, FILM COATED ORAL
Qty: 90 TABLET | Refills: 3 | Status: SHIPPED | OUTPATIENT
Start: 2019-01-01

## 2019-01-01 RX ORDER — AZELASTINE HYDROCHLORIDE 0.5 MG/ML
1 SOLUTION/ DROPS OPHTHALMIC 2 TIMES DAILY PRN
Status: DISCONTINUED | OUTPATIENT
Start: 2019-01-01 | End: 2019-01-01

## 2019-01-01 RX ORDER — SODIUM CHLORIDE 9 MG/ML
INJECTION, SOLUTION INTRAVENOUS CONTINUOUS
Status: DISCONTINUED | OUTPATIENT
Start: 2019-01-01 | End: 2019-01-01 | Stop reason: HOSPADM

## 2019-01-01 RX ORDER — ATORVASTATIN CALCIUM 40 MG/1
40 TABLET, FILM COATED ORAL DAILY
Qty: 90 TABLET | Refills: 3 | Status: SHIPPED | OUTPATIENT
Start: 2019-01-01 | End: 2020-01-01

## 2019-01-01 RX ORDER — POTASSIUM CHLORIDE 20 MEQ/1
40 TABLET, EXTENDED RELEASE ORAL 2 TIMES DAILY WITH MEALS
Status: DISCONTINUED | OUTPATIENT
Start: 2019-01-01 | End: 2019-01-01 | Stop reason: HOSPADM

## 2019-01-01 RX ORDER — CEFDINIR 300 MG/1
300 CAPSULE ORAL EVERY 12 HOURS SCHEDULED
Qty: 8 CAPSULE | Refills: 0 | Status: SHIPPED | OUTPATIENT
Start: 2019-01-01 | End: 2019-01-01

## 2019-01-01 RX ORDER — POTASSIUM CHLORIDE 750 MG/1
40 TABLET, FILM COATED, EXTENDED RELEASE ORAL ONCE
Status: DISCONTINUED | OUTPATIENT
Start: 2019-01-01 | End: 2019-01-01

## 2019-01-01 RX ORDER — ATORVASTATIN CALCIUM 40 MG/1
40 TABLET, FILM COATED ORAL DAILY
Status: DISCONTINUED | OUTPATIENT
Start: 2019-01-01 | End: 2019-01-01 | Stop reason: HOSPADM

## 2019-01-01 RX ORDER — PAROXETINE HYDROCHLORIDE 20 MG/1
40 TABLET, FILM COATED ORAL EVERY MORNING
Status: DISCONTINUED | OUTPATIENT
Start: 2019-01-01 | End: 2019-01-01 | Stop reason: HOSPADM

## 2019-01-01 RX ORDER — POTASSIUM CHLORIDE 7.45 MG/ML
10 INJECTION INTRAVENOUS
Status: DISPENSED | OUTPATIENT
Start: 2019-01-01 | End: 2019-01-01

## 2019-01-01 RX ORDER — CALCIUM CARBONATE 200(500)MG
500 TABLET,CHEWABLE ORAL 3 TIMES DAILY PRN
Status: DISCONTINUED | OUTPATIENT
Start: 2019-01-01 | End: 2019-01-01 | Stop reason: HOSPADM

## 2019-01-01 RX ORDER — CARVEDILOL 3.12 MG/1
3.12 TABLET ORAL 2 TIMES DAILY WITH MEALS
Qty: 180 TABLET | Refills: 3 | Status: SHIPPED | OUTPATIENT
Start: 2019-01-01

## 2019-01-01 RX ADMIN — DONEPEZIL HYDROCHLORIDE 5 MG: 5 TABLET, FILM COATED ORAL at 22:09

## 2019-01-01 RX ADMIN — CEFTRIAXONE SODIUM 1 G: 1 INJECTION, POWDER, FOR SOLUTION INTRAMUSCULAR; INTRAVENOUS at 02:30

## 2019-01-01 RX ADMIN — CARVEDILOL 3.12 MG: 3.12 TABLET, FILM COATED ORAL at 17:42

## 2019-01-01 RX ADMIN — TAMSULOSIN HYDROCHLORIDE 0.4 MG: 0.4 CAPSULE ORAL at 08:34

## 2019-01-01 RX ADMIN — POTASSIUM CHLORIDE 10 MEQ: 7.46 INJECTION, SOLUTION INTRAVENOUS at 17:42

## 2019-01-01 RX ADMIN — POTASSIUM CHLORIDE 10 MEQ: 7.46 INJECTION, SOLUTION INTRAVENOUS at 21:50

## 2019-01-01 RX ADMIN — CARVEDILOL 3.12 MG: 3.12 TABLET, FILM COATED ORAL at 09:28

## 2019-01-01 RX ADMIN — ATORVASTATIN CALCIUM 40 MG: 40 TABLET, FILM COATED ORAL at 08:29

## 2019-01-01 RX ADMIN — PAROXETINE HYDROCHLORIDE 40 MG: 20 TABLET, FILM COATED ORAL at 09:27

## 2019-01-01 RX ADMIN — CARVEDILOL 3.12 MG: 3.12 TABLET, FILM COATED ORAL at 18:32

## 2019-01-01 RX ADMIN — Medication 10 ML: at 22:08

## 2019-01-01 RX ADMIN — POTASSIUM CHLORIDE 10 MEQ: 7.46 INJECTION, SOLUTION INTRAVENOUS at 02:43

## 2019-01-01 RX ADMIN — TAMSULOSIN HYDROCHLORIDE 0.4 MG: 0.4 CAPSULE ORAL at 09:29

## 2019-01-01 RX ADMIN — LISINOPRIL 5 MG: 5 TABLET ORAL at 13:05

## 2019-01-01 RX ADMIN — Medication 10 ML: at 09:29

## 2019-01-01 RX ADMIN — CEFDINIR 300 MG: 300 CAPSULE ORAL at 22:19

## 2019-01-01 RX ADMIN — POTASSIUM CHLORIDE 10 MEQ: 7.46 INJECTION, SOLUTION INTRAVENOUS at 13:24

## 2019-01-01 RX ADMIN — POTASSIUM CHLORIDE 10 MEQ: 7.46 INJECTION, SOLUTION INTRAVENOUS at 19:48

## 2019-01-01 RX ADMIN — ACETAMINOPHEN 650 MG: 325 TABLET ORAL at 18:26

## 2019-01-01 RX ADMIN — DONEPEZIL HYDROCHLORIDE 5 MG: 5 TABLET, FILM COATED ORAL at 23:08

## 2019-01-01 RX ADMIN — PAROXETINE HYDROCHLORIDE 40 MG: 20 TABLET, FILM COATED ORAL at 08:33

## 2019-01-01 RX ADMIN — SODIUM CHLORIDE: 9 INJECTION, SOLUTION INTRAVENOUS at 01:49

## 2019-01-01 RX ADMIN — QUETIAPINE 25 MG: 25 TABLET, FILM COATED ORAL at 23:08

## 2019-01-01 RX ADMIN — TAMSULOSIN HYDROCHLORIDE 0.4 MG: 0.4 CAPSULE ORAL at 09:27

## 2019-01-01 RX ADMIN — ATORVASTATIN CALCIUM 40 MG: 40 TABLET, FILM COATED ORAL at 08:34

## 2019-01-01 RX ADMIN — LISINOPRIL 5 MG: 5 TABLET ORAL at 17:55

## 2019-01-01 RX ADMIN — Medication 10 ML: at 01:50

## 2019-01-01 RX ADMIN — POTASSIUM CHLORIDE 40 MEQ: 1500 TABLET, EXTENDED RELEASE ORAL at 08:32

## 2019-01-01 RX ADMIN — TAMSULOSIN HYDROCHLORIDE 0.4 MG: 0.4 CAPSULE ORAL at 08:30

## 2019-01-01 RX ADMIN — POTASSIUM CHLORIDE 10 MEQ: 7.46 INJECTION, SOLUTION INTRAVENOUS at 12:48

## 2019-01-01 RX ADMIN — CARVEDILOL 3.12 MG: 3.12 TABLET, FILM COATED ORAL at 08:34

## 2019-01-01 RX ADMIN — CEFDINIR 300 MG: 300 CAPSULE ORAL at 22:09

## 2019-01-01 RX ADMIN — SODIUM CHLORIDE: 9 INJECTION, SOLUTION INTRAVENOUS at 13:24

## 2019-01-01 RX ADMIN — ASPIRIN 81 MG 81 MG: 81 TABLET ORAL at 08:32

## 2019-01-01 RX ADMIN — CLOPIDOGREL BISULFATE 75 MG: 75 TABLET ORAL at 08:33

## 2019-01-01 RX ADMIN — DONEPEZIL HYDROCHLORIDE 5 MG: 5 TABLET, FILM COATED ORAL at 22:19

## 2019-01-01 RX ADMIN — PANTOPRAZOLE SODIUM 40 MG: 40 TABLET, DELAYED RELEASE ORAL at 05:53

## 2019-01-01 RX ADMIN — TETANUS TOXOID, REDUCED DIPHTHERIA TOXOID AND ACELLULAR PERTUSSIS VACCINE, ADSORBED 0.5 ML: 5; 2.5; 8; 8; 2.5 SUSPENSION INTRAMUSCULAR at 16:40

## 2019-01-01 RX ADMIN — SODIUM CHLORIDE 500 ML: 9 INJECTION, SOLUTION INTRAVENOUS at 19:46

## 2019-01-01 RX ADMIN — Medication 3 MG: at 22:09

## 2019-01-01 RX ADMIN — PANTOPRAZOLE SODIUM 40 MG: 40 TABLET, DELAYED RELEASE ORAL at 06:32

## 2019-01-01 RX ADMIN — PAROXETINE HYDROCHLORIDE 40 MG: 20 TABLET, FILM COATED ORAL at 09:29

## 2019-01-01 RX ADMIN — CARVEDILOL 3.12 MG: 3.12 TABLET, FILM COATED ORAL at 08:29

## 2019-01-01 RX ADMIN — ISOSORBIDE MONONITRATE 30 MG: 30 TABLET ORAL at 08:29

## 2019-01-01 RX ADMIN — POTASSIUM CHLORIDE 10 MEQ: 7.46 INJECTION, SOLUTION INTRAVENOUS at 23:51

## 2019-01-01 RX ADMIN — ATORVASTATIN CALCIUM 40 MG: 40 TABLET, FILM COATED ORAL at 09:29

## 2019-01-01 RX ADMIN — SODIUM CHLORIDE: 9 INJECTION, SOLUTION INTRAVENOUS at 16:04

## 2019-01-01 RX ADMIN — ISOSORBIDE MONONITRATE 30 MG: 30 TABLET ORAL at 09:29

## 2019-01-01 RX ADMIN — ISOSORBIDE MONONITRATE 30 MG: 30 TABLET ORAL at 08:34

## 2019-01-01 RX ADMIN — CLOPIDOGREL BISULFATE 75 MG: 75 TABLET ORAL at 09:29

## 2019-01-01 RX ADMIN — CEFTRIAXONE SODIUM 1 G: 1 INJECTION, POWDER, FOR SOLUTION INTRAMUSCULAR; INTRAVENOUS at 01:49

## 2019-01-01 RX ADMIN — LISINOPRIL 5 MG: 5 TABLET ORAL at 08:30

## 2019-01-01 RX ADMIN — POTASSIUM CHLORIDE 10 MEQ: 7.46 INJECTION, SOLUTION INTRAVENOUS at 05:26

## 2019-01-01 RX ADMIN — PANTOPRAZOLE SODIUM 40 MG: 40 TABLET, DELAYED RELEASE ORAL at 05:29

## 2019-01-01 RX ADMIN — CALCIUM GLUCONATE 1 G: 98 INJECTION, SOLUTION INTRAVENOUS at 18:21

## 2019-01-01 RX ADMIN — CLOPIDOGREL BISULFATE 75 MG: 75 TABLET ORAL at 09:28

## 2019-01-01 RX ADMIN — SODIUM CHLORIDE: 9 INJECTION, SOLUTION INTRAVENOUS at 12:47

## 2019-01-01 RX ADMIN — POTASSIUM CHLORIDE 10 MEQ: 7.46 INJECTION, SOLUTION INTRAVENOUS at 12:09

## 2019-01-01 RX ADMIN — CARVEDILOL 3.12 MG: 3.12 TABLET, FILM COATED ORAL at 09:29

## 2019-01-01 RX ADMIN — ACETAMINOPHEN 650 MG: 325 TABLET ORAL at 03:26

## 2019-01-01 RX ADMIN — Medication 3 MG: at 22:19

## 2019-01-01 RX ADMIN — ISOSORBIDE MONONITRATE 30 MG: 30 TABLET ORAL at 09:27

## 2019-01-01 RX ADMIN — CEFDINIR 300 MG: 300 CAPSULE ORAL at 08:30

## 2019-01-01 RX ADMIN — ACETAMINOPHEN 650 MG: 325 TABLET ORAL at 22:19

## 2019-01-01 RX ADMIN — ACETAMINOPHEN 650 MG: 325 TABLET ORAL at 23:08

## 2019-01-01 RX ADMIN — ASPIRIN 81 MG 81 MG: 81 TABLET ORAL at 09:28

## 2019-01-01 RX ADMIN — POTASSIUM CHLORIDE 40 MEQ: 20 TABLET, EXTENDED RELEASE ORAL at 05:54

## 2019-01-01 RX ADMIN — ASPIRIN 81 MG 81 MG: 81 TABLET ORAL at 08:33

## 2019-01-01 RX ADMIN — CEFDINIR 300 MG: 300 CAPSULE ORAL at 09:29

## 2019-01-01 RX ADMIN — CLOPIDOGREL BISULFATE 75 MG: 75 TABLET ORAL at 08:32

## 2019-01-01 RX ADMIN — PANTOPRAZOLE SODIUM 40 MG: 40 TABLET, DELAYED RELEASE ORAL at 06:15

## 2019-01-01 RX ADMIN — POTASSIUM CHLORIDE 10 MEQ: 7.46 INJECTION, SOLUTION INTRAVENOUS at 08:16

## 2019-01-01 RX ADMIN — ASPIRIN 81 MG 81 MG: 81 TABLET ORAL at 09:29

## 2019-01-01 RX ADMIN — CARVEDILOL 3.12 MG: 3.12 TABLET, FILM COATED ORAL at 18:20

## 2019-01-01 RX ADMIN — IOPAMIDOL 80 ML: 755 INJECTION, SOLUTION INTRAVENOUS at 15:42

## 2019-01-01 RX ADMIN — DONEPEZIL HYDROCHLORIDE 5 MG: 5 TABLET, FILM COATED ORAL at 01:48

## 2019-01-01 RX ADMIN — PAROXETINE HYDROCHLORIDE 40 MG: 20 TABLET, FILM COATED ORAL at 08:29

## 2019-01-01 ASSESSMENT — ENCOUNTER SYMPTOMS
SHORTNESS OF BREATH: 0
NAUSEA: 0
WHEEZING: 0
COUGH: 0
DIARRHEA: 0
COLOR CHANGE: 0
EYE PAIN: 0
NAUSEA: 0
CHEST TIGHTNESS: 0
RHINORRHEA: 0
PHOTOPHOBIA: 0
SHORTNESS OF BREATH: 0
BLOOD IN STOOL: 0
BACK PAIN: 1
COUGH: 0
DIARRHEA: 0
ABDOMINAL PAIN: 0
CHEST TIGHTNESS: 0
RHINORRHEA: 0
COUGH: 0
EYE DISCHARGE: 0
SORE THROAT: 0
ABDOMINAL PAIN: 0
WHEEZING: 0
ABDOMINAL DISTENTION: 0
VOMITING: 0
VOMITING: 0
SORE THROAT: 0
NAUSEA: 0
COUGH: 0
SHORTNESS OF BREATH: 0
SHORTNESS OF BREATH: 0
DIARRHEA: 1
VOMITING: 0
ABDOMINAL PAIN: 0
SINUS PRESSURE: 0
CONSTIPATION: 0
ALLERGIC/IMMUNOLOGIC NEGATIVE: 1
EYES NEGATIVE: 1
CONSTIPATION: 0
NAUSEA: 0
SORE THROAT: 0
CONSTIPATION: 0
WHEEZING: 0

## 2019-01-01 ASSESSMENT — PAIN DESCRIPTION - LOCATION
LOCATION: BACK;HIP

## 2019-01-01 ASSESSMENT — PAIN SCALES - GENERAL
PAINLEVEL_OUTOF10: 0
PAINLEVEL_OUTOF10: 3
PAINLEVEL_OUTOF10: 0
PAINLEVEL_OUTOF10: 3
PAINLEVEL_OUTOF10: 4
PAINLEVEL_OUTOF10: 0
PAINLEVEL_OUTOF10: 0
PAINLEVEL_OUTOF10: 3
PAINLEVEL_OUTOF10: 0

## 2019-01-01 ASSESSMENT — PAIN SCALES - WONG BAKER
WONGBAKER_NUMERICALRESPONSE: 4
WONGBAKER_NUMERICALRESPONSE: 0
WONGBAKER_NUMERICALRESPONSE: 0
WONGBAKER_NUMERICALRESPONSE: 4

## 2019-01-01 ASSESSMENT — PAIN DESCRIPTION - ORIENTATION: ORIENTATION: RIGHT

## 2019-01-01 ASSESSMENT — PAIN DESCRIPTION - DESCRIPTORS: DESCRIPTORS: ACHING

## 2019-02-04 ENCOUNTER — TELEPHONE (OUTPATIENT)
Dept: FAMILY MEDICINE CLINIC | Age: 84
End: 2019-02-04

## 2019-02-19 ENCOUNTER — TELEPHONE (OUTPATIENT)
Dept: FAMILY MEDICINE CLINIC | Age: 84
End: 2019-02-19

## 2019-02-20 ENCOUNTER — HOSPITAL ENCOUNTER (EMERGENCY)
Age: 84
Discharge: HOME | End: 2019-02-20
Attending: EMERGENCY MEDICINE

## 2019-02-20 VITALS
HEIGHT: 66 IN | SYSTOLIC BLOOD PRESSURE: 161 MMHG | BODY MASS INDEX: 21.69 KG/M2 | OXYGEN SATURATION: 100 % | HEART RATE: 79 BPM | TEMPERATURE: 98.1 F | RESPIRATION RATE: 16 BRPM | WEIGHT: 135 LBS | DIASTOLIC BLOOD PRESSURE: 86 MMHG

## 2019-02-20 DIAGNOSIS — K40.90 UNILATERAL INGUINAL HERNIA WITHOUT OBSTRUCTION OR GANGRENE, RECURRENCE NOT SPECIFIED: Primary | ICD-10-CM

## 2019-02-20 DIAGNOSIS — R15.1 FECAL SOILING: ICD-10-CM

## 2019-02-20 LAB
BACTERIA: ABNORMAL /HPF
BILIRUBIN URINE: NEGATIVE
BLOOD, URINE: ABNORMAL
CASTS 2: ABNORMAL /LPF
CASTS UA: ABNORMAL /LPF
CHARACTER, URINE: CLEAR
COLOR: YELLOW
CRYSTALS, UA: ABNORMAL
EPITHELIAL CELLS, UA: ABNORMAL /HPF
GLUCOSE URINE: NEGATIVE MG/DL
KETONES, URINE: NEGATIVE
LEUKOCYTE ESTERASE, URINE: NEGATIVE
MISCELLANEOUS 2: ABNORMAL
NITRITE, URINE: NEGATIVE
PH UA: 5
PROTEIN UA: ABNORMAL
RBC URINE: ABNORMAL /HPF
RENAL EPITHELIAL, UA: ABNORMAL
SPECIFIC GRAVITY, URINE: 1.01 (ref 1–1.03)
UROBILINOGEN, URINE: 0.2 EU/DL
WBC UA: ABNORMAL /HPF
YEAST: ABNORMAL

## 2019-02-20 PROCEDURE — 99283 EMERGENCY DEPT VISIT LOW MDM: CPT

## 2019-02-20 PROCEDURE — 81001 URINALYSIS AUTO W/SCOPE: CPT

## 2019-02-20 ASSESSMENT — ENCOUNTER SYMPTOMS
EYE REDNESS: 0
VOMITING: 0
WHEEZING: 0
DIARRHEA: 0
ABDOMINAL PAIN: 0
SORE THROAT: 0
RHINORRHEA: 0
BACK PAIN: 0
COUGH: 0
SHORTNESS OF BREATH: 0
NAUSEA: 0
EYE DISCHARGE: 0

## 2019-02-22 DIAGNOSIS — I25.10 CORONARY ARTERY DISEASE INVOLVING NATIVE CORONARY ARTERY OF NATIVE HEART WITHOUT ANGINA PECTORIS: ICD-10-CM

## 2019-02-23 ENCOUNTER — HOSPITAL ENCOUNTER (EMERGENCY)
Age: 84
Discharge: HOME | End: 2019-02-23

## 2019-02-23 VITALS
SYSTOLIC BLOOD PRESSURE: 183 MMHG | BODY MASS INDEX: 20.46 KG/M2 | HEIGHT: 68 IN | RESPIRATION RATE: 18 BRPM | HEART RATE: 70 BPM | OXYGEN SATURATION: 97 % | TEMPERATURE: 97.8 F | DIASTOLIC BLOOD PRESSURE: 83 MMHG | WEIGHT: 135 LBS

## 2019-02-23 DIAGNOSIS — S05.01XA ABRASION OF RIGHT CORNEA, INITIAL ENCOUNTER: Primary | ICD-10-CM

## 2019-02-23 DIAGNOSIS — H10.13 ALLERGIC CONJUNCTIVITIS OF BOTH EYES: ICD-10-CM

## 2019-02-23 DIAGNOSIS — S05.01XA SUPERFICIAL INJURY OF RIGHT CONJUNCTIVA, INITIAL ENCOUNTER: ICD-10-CM

## 2019-02-23 DIAGNOSIS — L85.3 DRY SKIN: ICD-10-CM

## 2019-02-23 PROCEDURE — 99213 OFFICE O/P EST LOW 20 MIN: CPT | Performed by: NURSE PRACTITIONER

## 2019-02-23 PROCEDURE — 99212 OFFICE O/P EST SF 10 MIN: CPT

## 2019-02-23 RX ORDER — WATER / MINERAL OIL / WHITE PETROLATUM 16 OZ
CREAM TOPICAL
Qty: 1 PACKAGE | Refills: 0 | Status: SHIPPED | OUTPATIENT
Start: 2019-02-23

## 2019-02-23 RX ORDER — SOFT LENS RINSE,STORE SOLUTION
1 SOLUTION, NON-ORAL MISCELLANEOUS ONCE
Status: DISCONTINUED | OUTPATIENT
Start: 2019-02-23 | End: 2019-02-23 | Stop reason: HOSPADM

## 2019-02-23 RX ORDER — PROPARACAINE HYDROCHLORIDE 5 MG/ML
2 SOLUTION/ DROPS OPHTHALMIC ONCE
Status: DISCONTINUED | OUTPATIENT
Start: 2019-02-23 | End: 2019-02-23 | Stop reason: HOSPADM

## 2019-02-23 RX ORDER — CLOPIDOGREL BISULFATE 75 MG/1
TABLET ORAL
Qty: 90 TABLET | Refills: 1 | Status: SHIPPED | OUTPATIENT
Start: 2019-02-23 | End: 2019-03-19 | Stop reason: SDUPTHER

## 2019-02-23 RX ORDER — ERYTHROMYCIN 5 MG/G
OINTMENT OPHTHALMIC 2 TIMES DAILY
Qty: 1 TUBE | Refills: 0 | Status: SHIPPED | OUTPATIENT
Start: 2019-02-23 | End: 2019-02-26

## 2019-02-23 RX ORDER — AZELASTINE HYDROCHLORIDE 0.5 MG/ML
1 SOLUTION/ DROPS OPHTHALMIC 2 TIMES DAILY
Qty: 1 BOTTLE | Refills: 0 | Status: SHIPPED | OUTPATIENT
Start: 2019-02-23 | End: 2019-03-07 | Stop reason: ALTCHOICE

## 2019-02-23 ASSESSMENT — PAIN DESCRIPTION - LOCATION: LOCATION: EYE

## 2019-02-23 ASSESSMENT — PAIN DESCRIPTION - ORIENTATION: ORIENTATION: RIGHT

## 2019-02-23 ASSESSMENT — PAIN SCALES - GENERAL: PAINLEVEL_OUTOF10: 5

## 2019-02-26 RX ORDER — SULFACETAMIDE SODIUM 100 MG/G
0.5 OINTMENT OPHTHALMIC 2 TIMES DAILY
Qty: 1 TUBE | Refills: 0 | Status: SHIPPED | OUTPATIENT
Start: 2019-02-26 | End: 2019-03-05

## 2019-02-26 ASSESSMENT — ENCOUNTER SYMPTOMS
EYE REDNESS: 0
EYE DISCHARGE: 1
EYE WATERING: 1
EYE INFLAMMATION: 0
VOMITING: 0
PHOTOPHOBIA: 0
DOUBLE VISION: 0
BLURRED VISION: 0
EYE ITCHING: 1
PERI-ORBITAL EDEMA: 0
NAUSEA: 0
CRUSTING: 0
BLIND SPOTS: 0

## 2019-03-06 ENCOUNTER — TELEPHONE (OUTPATIENT)
Dept: FAMILY MEDICINE CLINIC | Age: 84
End: 2019-03-06

## 2019-03-07 ENCOUNTER — OFFICE VISIT (OUTPATIENT)
Dept: FAMILY MEDICINE CLINIC | Age: 84
End: 2019-03-07

## 2019-03-07 VITALS
SYSTOLIC BLOOD PRESSURE: 120 MMHG | BODY MASS INDEX: 20.37 KG/M2 | HEART RATE: 70 BPM | OXYGEN SATURATION: 99 % | DIASTOLIC BLOOD PRESSURE: 72 MMHG | WEIGHT: 134 LBS | RESPIRATION RATE: 14 BRPM

## 2019-03-07 DIAGNOSIS — F02.818 LATE ONSET ALZHEIMER'S DISEASE WITH BEHAVIORAL DISTURBANCE (HCC): ICD-10-CM

## 2019-03-07 DIAGNOSIS — M25.552 LEFT HIP PAIN: Primary | ICD-10-CM

## 2019-03-07 DIAGNOSIS — G30.1 LATE ONSET ALZHEIMER'S DISEASE WITH BEHAVIORAL DISTURBANCE (HCC): ICD-10-CM

## 2019-03-07 DIAGNOSIS — I48.0 PAROXYSMAL ATRIAL FIBRILLATION (HCC): ICD-10-CM

## 2019-03-07 DIAGNOSIS — I25.119 CORONARY ARTERY DISEASE WITH ANGINA PECTORIS, UNSPECIFIED VESSEL OR LESION TYPE, UNSPECIFIED WHETHER NATIVE OR TRANSPLANTED HEART (HCC): ICD-10-CM

## 2019-03-07 DIAGNOSIS — F41.9 ANXIETY: ICD-10-CM

## 2019-03-07 DIAGNOSIS — R63.4 WEIGHT LOSS: ICD-10-CM

## 2019-03-07 PROCEDURE — 99205 OFFICE O/P NEW HI 60 MIN: CPT | Performed by: FAMILY MEDICINE

## 2019-03-07 ASSESSMENT — ENCOUNTER SYMPTOMS
APNEA: 0
BLOOD IN STOOL: 0
SINUS PRESSURE: 0
VOMITING: 0
CONSTIPATION: 0
NAUSEA: 0
EYE REDNESS: 0
SORE THROAT: 0
COUGH: 0
EYE DISCHARGE: 0
SHORTNESS OF BREATH: 0
DIARRHEA: 0
RHINORRHEA: 0
WHEEZING: 0
BACK PAIN: 0
ABDOMINAL PAIN: 0
COLOR CHANGE: 0

## 2019-03-07 ASSESSMENT — PATIENT HEALTH QUESTIONNAIRE - PHQ9
2. FEELING DOWN, DEPRESSED OR HOPELESS: 0
SUM OF ALL RESPONSES TO PHQ QUESTIONS 1-9: 0
1. LITTLE INTEREST OR PLEASURE IN DOING THINGS: 0
SUM OF ALL RESPONSES TO PHQ9 QUESTIONS 1 & 2: 0
SUM OF ALL RESPONSES TO PHQ QUESTIONS 1-9: 0

## 2019-03-10 ENCOUNTER — HOSPITAL ENCOUNTER (OUTPATIENT)
Age: 84
Discharge: HOME | End: 2019-03-10

## 2019-03-10 ENCOUNTER — HOSPITAL ENCOUNTER (OUTPATIENT)
Dept: GENERAL RADIOLOGY | Age: 84
Discharge: HOME | End: 2019-03-10

## 2019-03-10 DIAGNOSIS — M25.552 LEFT HIP PAIN: ICD-10-CM

## 2019-03-10 DIAGNOSIS — R63.4 WEIGHT LOSS: ICD-10-CM

## 2019-03-10 DIAGNOSIS — I25.119 CORONARY ARTERY DISEASE WITH ANGINA PECTORIS, UNSPECIFIED VESSEL OR LESION TYPE, UNSPECIFIED WHETHER NATIVE OR TRANSPLANTED HEART (HCC): ICD-10-CM

## 2019-03-10 LAB
ALBUMIN SERPL-MCNC: 4 G/DL (ref 3.5–5.1)
ALP BLD-CCNC: 53 U/L (ref 38–126)
ALT SERPL-CCNC: < 5 U/L (ref 11–66)
ANION GAP SERPL CALCULATED.3IONS-SCNC: 10 MEQ/L (ref 8–16)
AST SERPL-CCNC: 12 U/L (ref 5–40)
BASOPHILS # BLD: 0.4 %
BASOPHILS ABSOLUTE: 0 THOU/MM3 (ref 0–0.1)
BILIRUB SERPL-MCNC: 0.5 MG/DL (ref 0.3–1.2)
BUN BLDV-MCNC: 24 MG/DL (ref 7–22)
CALCIUM SERPL-MCNC: 10.3 MG/DL (ref 8.5–10.5)
CHLORIDE BLD-SCNC: 105 MEQ/L (ref 98–111)
CHOLESTEROL, TOTAL: 191 MG/DL (ref 100–199)
CO2: 25 MEQ/L (ref 23–33)
CREAT SERPL-MCNC: 1 MG/DL (ref 0.4–1.2)
EOSINOPHIL # BLD: 1.4 %
EOSINOPHILS ABSOLUTE: 0.1 THOU/MM3 (ref 0–0.4)
ERYTHROCYTE [DISTWIDTH] IN BLOOD BY AUTOMATED COUNT: 14 % (ref 11.5–14.5)
ERYTHROCYTE [DISTWIDTH] IN BLOOD BY AUTOMATED COUNT: 53.2 FL (ref 35–45)
GFR SERPL CREATININE-BSD FRML MDRD: 70 ML/MIN/1.73M2
GLUCOSE BLD-MCNC: 88 MG/DL (ref 70–108)
HCT VFR BLD CALC: 42.8 % (ref 42–52)
HDLC SERPL-MCNC: 42 MG/DL
HEMOGLOBIN: 13.6 GM/DL (ref 14–18)
IMMATURE GRANS (ABS): 0.01 THOU/MM3 (ref 0–0.07)
IMMATURE GRANULOCYTES: 0.2 %
LDL CHOLESTEROL CALCULATED: 119 MG/DL
LYMPHOCYTES # BLD: 20.8 %
LYMPHOCYTES ABSOLUTE: 1.1 THOU/MM3 (ref 1–4.8)
MCH RBC QN AUTO: 33.1 PG (ref 26–33)
MCHC RBC AUTO-ENTMCNC: 31.8 GM/DL (ref 32.2–35.5)
MCV RBC AUTO: 104.1 FL (ref 80–94)
MONOCYTES # BLD: 6.5 %
MONOCYTES ABSOLUTE: 0.4 THOU/MM3 (ref 0.4–1.3)
NUCLEATED RED BLOOD CELLS: 0 /100 WBC
PLATELET # BLD: 254 THOU/MM3 (ref 130–400)
PMV BLD AUTO: 10 FL (ref 9.4–12.4)
POTASSIUM SERPL-SCNC: 4.9 MEQ/L (ref 3.5–5.2)
RBC # BLD: 4.11 MILL/MM3 (ref 4.7–6.1)
SEG NEUTROPHILS: 70.7 %
SEGMENTED NEUTROPHILS ABSOLUTE COUNT: 3.9 THOU/MM3 (ref 1.8–7.7)
SODIUM BLD-SCNC: 140 MEQ/L (ref 135–145)
TOTAL PROTEIN: 6.7 G/DL (ref 6.1–8)
TRIGL SERPL-MCNC: 149 MG/DL (ref 0–199)
TSH SERPL DL<=0.05 MIU/L-ACNC: 0.83 UIU/ML (ref 0.4–4.2)
WBC # BLD: 5.5 THOU/MM3 (ref 4.8–10.8)

## 2019-03-10 PROCEDURE — 80053 COMPREHEN METABOLIC PANEL: CPT

## 2019-03-10 PROCEDURE — 85025 COMPLETE CBC W/AUTO DIFF WBC: CPT

## 2019-03-10 PROCEDURE — 80061 LIPID PANEL: CPT

## 2019-03-10 PROCEDURE — 84443 ASSAY THYROID STIM HORMONE: CPT

## 2019-03-10 PROCEDURE — 73502 X-RAY EXAM HIP UNI 2-3 VIEWS: CPT

## 2019-03-10 PROCEDURE — 36415 COLL VENOUS BLD VENIPUNCTURE: CPT

## 2019-03-12 ENCOUNTER — TELEPHONE (OUTPATIENT)
Dept: FAMILY MEDICINE CLINIC | Age: 84
End: 2019-03-12

## 2019-03-12 DIAGNOSIS — R29.898 MUSCULAR DECONDITIONING: Primary | ICD-10-CM

## 2019-03-19 ENCOUNTER — TELEPHONE (OUTPATIENT)
Dept: FAMILY MEDICINE CLINIC | Age: 84
End: 2019-03-19

## 2019-03-19 ENCOUNTER — OFFICE VISIT (OUTPATIENT)
Dept: CARDIOLOGY CLINIC | Age: 84
End: 2019-03-19

## 2019-03-19 VITALS
HEIGHT: 68 IN | HEART RATE: 68 BPM | SYSTOLIC BLOOD PRESSURE: 134 MMHG | WEIGHT: 128.25 LBS | DIASTOLIC BLOOD PRESSURE: 68 MMHG | BODY MASS INDEX: 19.44 KG/M2

## 2019-03-19 DIAGNOSIS — I48.0 PAROXYSMAL ATRIAL FIBRILLATION (HCC): ICD-10-CM

## 2019-03-19 DIAGNOSIS — Z95.0 PACEMAKER: ICD-10-CM

## 2019-03-19 DIAGNOSIS — I25.10 CORONARY ARTERY DISEASE INVOLVING NATIVE CORONARY ARTERY OF NATIVE HEART WITHOUT ANGINA PECTORIS: Primary | ICD-10-CM

## 2019-03-19 DIAGNOSIS — I10 ESSENTIAL HYPERTENSION: ICD-10-CM

## 2019-03-19 DIAGNOSIS — Z95.1 S/P CABG (CORONARY ARTERY BYPASS GRAFT): ICD-10-CM

## 2019-03-19 DIAGNOSIS — I50.22 CHRONIC SYSTOLIC CONGESTIVE HEART FAILURE (HCC): ICD-10-CM

## 2019-03-19 PROCEDURE — 99213 OFFICE O/P EST LOW 20 MIN: CPT | Performed by: PHYSICIAN ASSISTANT

## 2019-03-19 RX ORDER — ATORVASTATIN CALCIUM 40 MG/1
40 TABLET, FILM COATED ORAL DAILY
Qty: 30 TABLET | Refills: 3 | Status: SHIPPED | OUTPATIENT
Start: 2019-03-19 | End: 2019-01-01 | Stop reason: SDUPTHER

## 2019-03-23 ENCOUNTER — APPOINTMENT (OUTPATIENT)
Dept: GENERAL RADIOLOGY | Age: 84
DRG: 682 | End: 2019-03-23
Payer: MEDICARE

## 2019-03-23 ENCOUNTER — APPOINTMENT (OUTPATIENT)
Dept: CT IMAGING | Age: 84
DRG: 682 | End: 2019-03-23
Payer: MEDICARE

## 2019-03-23 ENCOUNTER — HOSPITAL ENCOUNTER (INPATIENT)
Age: 84
LOS: 3 days | Discharge: OTHER INSTITUTION | DRG: 682 | End: 2019-03-26
Attending: EMERGENCY MEDICINE | Admitting: INTERNAL MEDICINE
Payer: MEDICARE

## 2019-03-23 DIAGNOSIS — G93.41 METABOLIC ENCEPHALOPATHY: Primary | ICD-10-CM

## 2019-03-23 DIAGNOSIS — R41.82 ALTERED MENTAL STATUS, UNSPECIFIED ALTERED MENTAL STATUS TYPE: ICD-10-CM

## 2019-03-23 DIAGNOSIS — N17.9 AKI (ACUTE KIDNEY INJURY) (HCC): ICD-10-CM

## 2019-03-23 PROBLEM — R10.84 GENERALIZED ABDOMINAL PAIN: Status: ACTIVE | Noted: 2019-03-23

## 2019-03-23 LAB
ALBUMIN SERPL-MCNC: 3.6 G/DL (ref 3.5–5.1)
ALP BLD-CCNC: 60 U/L (ref 38–126)
ALT SERPL-CCNC: 8 U/L (ref 11–66)
AMORPHOUS: ABNORMAL
ANION GAP SERPL CALCULATED.3IONS-SCNC: 15 MEQ/L (ref 8–16)
ANION GAP SERPL CALCULATED.3IONS-SCNC: 15 MEQ/L (ref 8–16)
APTT: 26.1 SECONDS (ref 22–38)
AST SERPL-CCNC: 15 U/L (ref 5–40)
AVERAGE GLUCOSE: 99 MG/DL (ref 70–126)
BACTERIA: ABNORMAL /HPF
BASE EXCESS MIXED: -0.2 MMOL/L (ref -2–3)
BASOPHILS # BLD: 0.2 %
BASOPHILS ABSOLUTE: 0 THOU/MM3 (ref 0–0.1)
BILIRUB SERPL-MCNC: 0.7 MG/DL (ref 0.3–1.2)
BILIRUBIN URINE: NEGATIVE
BLOOD, URINE: ABNORMAL
BUN BLDV-MCNC: 39 MG/DL (ref 7–22)
BUN BLDV-MCNC: 43 MG/DL (ref 7–22)
CALCIUM SERPL-MCNC: 10.4 MG/DL (ref 8.5–10.5)
CALCIUM SERPL-MCNC: 9.8 MG/DL (ref 8.5–10.5)
CASTS 2: ABNORMAL /LPF
CASTS UA: ABNORMAL /LPF
CHARACTER, URINE: ABNORMAL
CHLORIDE BLD-SCNC: 104 MEQ/L (ref 98–111)
CHLORIDE BLD-SCNC: 107 MEQ/L (ref 98–111)
CO2: 22 MEQ/L (ref 23–33)
CO2: 23 MEQ/L (ref 23–33)
COLLECTED BY:: ABNORMAL
COLOR: YELLOW
CREAT SERPL-MCNC: 1.4 MG/DL (ref 0.4–1.2)
CREAT SERPL-MCNC: 1.7 MG/DL (ref 0.4–1.2)
CRYSTALS, UA: ABNORMAL
DEVICE: ABNORMAL
EKG ATRIAL RATE: 71 BPM
EKG P AXIS: 75 DEGREES
EKG Q-T INTERVAL: 464 MS
EKG QRS DURATION: 184 MS
EKG QTC CALCULATION (BAZETT): 515 MS
EKG R AXIS: -108 DEGREES
EKG T AXIS: 85 DEGREES
EKG VENTRICULAR RATE: 74 BPM
EOSINOPHIL # BLD: 0.1 %
EOSINOPHILS ABSOLUTE: 0 THOU/MM3 (ref 0–0.4)
EPITHELIAL CELLS, UA: ABNORMAL /HPF
ERYTHROCYTE [DISTWIDTH] IN BLOOD BY AUTOMATED COUNT: 13.7 % (ref 11.5–14.5)
ERYTHROCYTE [DISTWIDTH] IN BLOOD BY AUTOMATED COUNT: 51 FL (ref 35–45)
FLU A ANTIGEN: NEGATIVE
FLU B ANTIGEN: NEGATIVE
FOLATE: 16.2 NG/ML (ref 4.8–24.2)
GFR SERPL CREATININE-BSD FRML MDRD: 38 ML/MIN/1.73M2
GFR SERPL CREATININE-BSD FRML MDRD: 48 ML/MIN/1.73M2
GLUCOSE BLD-MCNC: 106 MG/DL (ref 70–108)
GLUCOSE BLD-MCNC: 128 MG/DL (ref 70–108)
GLUCOSE BLD-MCNC: 130 MG/DL (ref 70–108)
GLUCOSE BLD-MCNC: 130 MG/DL (ref 70–108)
GLUCOSE BLD-MCNC: 151 MG/DL (ref 70–108)
GLUCOSE BLD-MCNC: 161 MG/DL (ref 70–108)
GLUCOSE BLD-MCNC: 169 MG/DL (ref 70–108)
GLUCOSE URINE: NEGATIVE MG/DL
HBA1C MFR BLD: 5.3 % (ref 4.4–6.4)
HCO3, MIXED: 26 MMOL/L (ref 23–28)
HCT VFR BLD CALC: 46.4 % (ref 42–52)
HEMOGLOBIN: 15 GM/DL (ref 14–18)
IMMATURE GRANS (ABS): 0.07 THOU/MM3 (ref 0–0.07)
IMMATURE GRANULOCYTES: 0.6 %
INR BLD: 0.97 (ref 0.85–1.13)
KETONES, URINE: NEGATIVE
LACTIC ACID: 2.1 MMOL/L (ref 0.5–2.2)
LEUKOCYTE ESTERASE, URINE: NEGATIVE
LIPASE: 35.9 U/L (ref 5.6–51.3)
LV EF: 53 %
LVEF MODALITY: NORMAL
LYMPHOCYTES # BLD: 6.8 %
LYMPHOCYTES ABSOLUTE: 0.8 THOU/MM3 (ref 1–4.8)
MCH RBC QN AUTO: 32.8 PG (ref 26–33)
MCHC RBC AUTO-ENTMCNC: 32.3 GM/DL (ref 32.2–35.5)
MCV RBC AUTO: 101.3 FL (ref 80–94)
MISCELLANEOUS 2: ABNORMAL
MONOCYTES # BLD: 9 %
MONOCYTES ABSOLUTE: 1.1 THOU/MM3 (ref 0.4–1.3)
MUCUS: ABNORMAL
NITRITE, URINE: NEGATIVE
NUCLEATED RED BLOOD CELLS: 0 /100 WBC
O2 SAT, MIXED: 87 %
OSMOLALITY CALCULATION: 303.4 MOSMOL/KG (ref 275–300)
PCO2, MIXED VENOUS: 48 MMHG (ref 41–51)
PH UA: 5.5 (ref 5–9)
PH, MIXED: 7.35 (ref 7.31–7.41)
PHOSPHORUS: 4.4 MG/DL (ref 2.4–4.7)
PLATELET # BLD: 271 THOU/MM3 (ref 130–400)
PMV BLD AUTO: 9.9 FL (ref 9.4–12.4)
PO2 MIXED: 55 MMHG (ref 25–40)
POTASSIUM SERPL-SCNC: 4.1 MEQ/L (ref 3.5–5.2)
POTASSIUM SERPL-SCNC: 4.2 MEQ/L (ref 3.5–5.2)
PRO-BNP: 3842 PG/ML (ref 0–1800)
PROCALCITONIN: 0.11 NG/ML (ref 0.01–0.09)
PROTEIN UA: ABNORMAL
RBC # BLD: 4.58 MILL/MM3 (ref 4.7–6.1)
RBC URINE: ABNORMAL /HPF
RENAL EPITHELIAL, UA: ABNORMAL
SEG NEUTROPHILS: 83.3 %
SEGMENTED NEUTROPHILS ABSOLUTE COUNT: 9.9 THOU/MM3 (ref 1.8–7.7)
SODIUM BLD-SCNC: 141 MEQ/L (ref 135–145)
SODIUM BLD-SCNC: 145 MEQ/L (ref 135–145)
SPECIFIC GRAVITY, URINE: 1.01 (ref 1–1.03)
TOTAL CK: 24 U/L (ref 55–170)
TOTAL PROTEIN: 7 G/DL (ref 6.1–8)
TROPONIN T: < 0.01 NG/ML
TSH SERPL DL<=0.05 MIU/L-ACNC: 0.93 UIU/ML (ref 0.4–4.2)
UROBILINOGEN, URINE: 1 EU/DL (ref 0–1)
VITAMIN B-12: 308 PG/ML (ref 211–911)
WBC # BLD: 11.9 THOU/MM3 (ref 4.8–10.8)
WBC UA: ABNORMAL /HPF
YEAST: ABNORMAL

## 2019-03-23 PROCEDURE — 6370000000 HC RX 637 (ALT 250 FOR IP): Performed by: PHYSICIAN ASSISTANT

## 2019-03-23 PROCEDURE — 82948 REAGENT STRIP/BLOOD GLUCOSE: CPT

## 2019-03-23 PROCEDURE — 93005 ELECTROCARDIOGRAM TRACING: CPT | Performed by: EMERGENCY MEDICINE

## 2019-03-23 PROCEDURE — 83690 ASSAY OF LIPASE: CPT

## 2019-03-23 PROCEDURE — 93306 TTE W/DOPPLER COMPLETE: CPT

## 2019-03-23 PROCEDURE — 70450 CT HEAD/BRAIN W/O DYE: CPT

## 2019-03-23 PROCEDURE — 87040 BLOOD CULTURE FOR BACTERIA: CPT

## 2019-03-23 PROCEDURE — 81001 URINALYSIS AUTO W/SCOPE: CPT

## 2019-03-23 PROCEDURE — 1200000003 HC TELEMETRY R&B

## 2019-03-23 PROCEDURE — 84145 PROCALCITONIN (PCT): CPT

## 2019-03-23 PROCEDURE — 83880 ASSAY OF NATRIURETIC PEPTIDE: CPT

## 2019-03-23 PROCEDURE — 99223 1ST HOSP IP/OBS HIGH 75: CPT | Performed by: PHYSICIAN ASSISTANT

## 2019-03-23 PROCEDURE — 82607 VITAMIN B-12: CPT

## 2019-03-23 PROCEDURE — 82746 ASSAY OF FOLIC ACID SERUM: CPT

## 2019-03-23 PROCEDURE — 74176 CT ABD & PELVIS W/O CONTRAST: CPT

## 2019-03-23 PROCEDURE — 82550 ASSAY OF CK (CPK): CPT

## 2019-03-23 PROCEDURE — 87086 URINE CULTURE/COLONY COUNT: CPT

## 2019-03-23 PROCEDURE — 84100 ASSAY OF PHOSPHORUS: CPT

## 2019-03-23 PROCEDURE — 97535 SELF CARE MNGMENT TRAINING: CPT

## 2019-03-23 PROCEDURE — 2580000003 HC RX 258: Performed by: PHYSICIAN ASSISTANT

## 2019-03-23 PROCEDURE — 83036 HEMOGLOBIN GLYCOSYLATED A1C: CPT

## 2019-03-23 PROCEDURE — 36415 COLL VENOUS BLD VENIPUNCTURE: CPT

## 2019-03-23 PROCEDURE — 84443 ASSAY THYROID STIM HORMONE: CPT

## 2019-03-23 PROCEDURE — 71045 X-RAY EXAM CHEST 1 VIEW: CPT

## 2019-03-23 PROCEDURE — 2580000003 HC RX 258: Performed by: EMERGENCY MEDICINE

## 2019-03-23 PROCEDURE — 80053 COMPREHEN METABOLIC PANEL: CPT

## 2019-03-23 PROCEDURE — 85025 COMPLETE CBC W/AUTO DIFF WBC: CPT

## 2019-03-23 PROCEDURE — 84484 ASSAY OF TROPONIN QUANT: CPT

## 2019-03-23 PROCEDURE — 99285 EMERGENCY DEPT VISIT HI MDM: CPT

## 2019-03-23 PROCEDURE — 83605 ASSAY OF LACTIC ACID: CPT

## 2019-03-23 PROCEDURE — 2709999900 HC NON-CHARGEABLE SUPPLY

## 2019-03-23 PROCEDURE — 80048 BASIC METABOLIC PNL TOTAL CA: CPT

## 2019-03-23 PROCEDURE — 85730 THROMBOPLASTIN TIME PARTIAL: CPT

## 2019-03-23 PROCEDURE — 85610 PROTHROMBIN TIME: CPT

## 2019-03-23 PROCEDURE — 87804 INFLUENZA ASSAY W/OPTIC: CPT

## 2019-03-23 PROCEDURE — 97166 OT EVAL MOD COMPLEX 45 MIN: CPT

## 2019-03-23 RX ORDER — LANOLIN ALCOHOL/MO/W.PET/CERES
1000 CREAM (GRAM) TOPICAL DAILY
Status: DISCONTINUED | OUTPATIENT
Start: 2019-03-23 | End: 2019-03-26 | Stop reason: HOSPADM

## 2019-03-23 RX ORDER — CLOPIDOGREL BISULFATE 75 MG/1
75 TABLET ORAL DAILY
Status: DISCONTINUED | OUTPATIENT
Start: 2019-03-23 | End: 2019-03-26 | Stop reason: HOSPADM

## 2019-03-23 RX ORDER — 0.9 % SODIUM CHLORIDE 0.9 %
30 INTRAVENOUS SOLUTION INTRAVENOUS ONCE
Status: COMPLETED | OUTPATIENT
Start: 2019-03-23 | End: 2019-03-23

## 2019-03-23 RX ORDER — ATORVASTATIN CALCIUM 40 MG/1
40 TABLET, FILM COATED ORAL DAILY
Status: DISCONTINUED | OUTPATIENT
Start: 2019-03-23 | End: 2019-03-26 | Stop reason: HOSPADM

## 2019-03-23 RX ORDER — DONEPEZIL HYDROCHLORIDE 5 MG/1
5 TABLET, FILM COATED ORAL NIGHTLY
Status: DISCONTINUED | OUTPATIENT
Start: 2019-03-23 | End: 2019-03-26 | Stop reason: HOSPADM

## 2019-03-23 RX ORDER — MULTIVITAMIN WITH FOLIC ACID 400 MCG
1 TABLET ORAL DAILY
Status: DISCONTINUED | OUTPATIENT
Start: 2019-03-23 | End: 2019-03-26 | Stop reason: HOSPADM

## 2019-03-23 RX ORDER — LISINOPRIL 5 MG/1
5 TABLET ORAL DAILY
Status: DISCONTINUED | OUTPATIENT
Start: 2019-03-23 | End: 2019-03-23

## 2019-03-23 RX ORDER — ASPIRIN 81 MG/1
81 TABLET ORAL DAILY
Status: DISCONTINUED | OUTPATIENT
Start: 2019-03-23 | End: 2019-03-26 | Stop reason: HOSPADM

## 2019-03-23 RX ORDER — MECLIZINE HCL 12.5 MG/1
12.5 TABLET ORAL 3 TIMES DAILY PRN
Status: DISCONTINUED | OUTPATIENT
Start: 2019-03-23 | End: 2019-03-26 | Stop reason: HOSPADM

## 2019-03-23 RX ORDER — PANTOPRAZOLE SODIUM 40 MG/1
40 TABLET, DELAYED RELEASE ORAL
Status: DISCONTINUED | OUTPATIENT
Start: 2019-03-23 | End: 2019-03-26 | Stop reason: HOSPADM

## 2019-03-23 RX ORDER — CELECOXIB 100 MG/1
100 CAPSULE ORAL 2 TIMES DAILY
Status: DISCONTINUED | OUTPATIENT
Start: 2019-03-23 | End: 2019-03-23

## 2019-03-23 RX ORDER — NICOTINE POLACRILEX 4 MG
15 LOZENGE BUCCAL PRN
Status: DISCONTINUED | OUTPATIENT
Start: 2019-03-23 | End: 2019-03-26 | Stop reason: HOSPADM

## 2019-03-23 RX ORDER — DEXTROSE MONOHYDRATE 50 MG/ML
100 INJECTION, SOLUTION INTRAVENOUS PRN
Status: DISCONTINUED | OUTPATIENT
Start: 2019-03-23 | End: 2019-03-26 | Stop reason: HOSPADM

## 2019-03-23 RX ORDER — TAMSULOSIN HYDROCHLORIDE 0.4 MG/1
0.4 CAPSULE ORAL DAILY
Status: DISCONTINUED | OUTPATIENT
Start: 2019-03-23 | End: 2019-03-23

## 2019-03-23 RX ORDER — PAROXETINE 30 MG/1
30 TABLET, FILM COATED ORAL EVERY MORNING
Status: DISCONTINUED | OUTPATIENT
Start: 2019-03-23 | End: 2019-03-26 | Stop reason: HOSPADM

## 2019-03-23 RX ORDER — NITROGLYCERIN 0.4 MG/1
0.4 TABLET SUBLINGUAL EVERY 5 MIN PRN
Status: DISCONTINUED | OUTPATIENT
Start: 2019-03-23 | End: 2019-03-26 | Stop reason: HOSPADM

## 2019-03-23 RX ORDER — SODIUM CHLORIDE 9 MG/ML
INJECTION, SOLUTION INTRAVENOUS CONTINUOUS
Status: DISCONTINUED | OUTPATIENT
Start: 2019-03-23 | End: 2019-03-25

## 2019-03-23 RX ORDER — DOCUSATE SODIUM 100 MG/1
100 CAPSULE, LIQUID FILLED ORAL 2 TIMES DAILY
Status: DISCONTINUED | OUTPATIENT
Start: 2019-03-23 | End: 2019-03-26 | Stop reason: HOSPADM

## 2019-03-23 RX ORDER — DEXTROSE MONOHYDRATE 25 G/50ML
12.5 INJECTION, SOLUTION INTRAVENOUS PRN
Status: DISCONTINUED | OUTPATIENT
Start: 2019-03-23 | End: 2019-03-26 | Stop reason: HOSPADM

## 2019-03-23 RX ORDER — ISOSORBIDE MONONITRATE 30 MG/1
30 TABLET, EXTENDED RELEASE ORAL DAILY
Status: DISCONTINUED | OUTPATIENT
Start: 2019-03-23 | End: 2019-03-26 | Stop reason: HOSPADM

## 2019-03-23 RX ORDER — ACETAMINOPHEN 325 MG/1
650 TABLET ORAL EVERY 6 HOURS PRN
Status: DISCONTINUED | OUTPATIENT
Start: 2019-03-23 | End: 2019-03-26 | Stop reason: HOSPADM

## 2019-03-23 RX ORDER — CARVEDILOL 3.12 MG/1
3.12 TABLET ORAL 2 TIMES DAILY WITH MEALS
Status: DISCONTINUED | OUTPATIENT
Start: 2019-03-23 | End: 2019-03-26 | Stop reason: HOSPADM

## 2019-03-23 RX ADMIN — VITAMIN D, TAB 1000IU (100/BT) 5000 UNITS: 25 TAB at 10:47

## 2019-03-23 RX ADMIN — DOCUSATE SODIUM 100 MG: 100 CAPSULE, LIQUID FILLED ORAL at 21:16

## 2019-03-23 RX ADMIN — DOCUSATE SODIUM 100 MG: 100 CAPSULE, LIQUID FILLED ORAL at 10:47

## 2019-03-23 RX ADMIN — THERA TABS 1 TABLET: TAB at 10:48

## 2019-03-23 RX ADMIN — SODIUM CHLORIDE 1770 ML: 9 INJECTION, SOLUTION INTRAVENOUS at 04:36

## 2019-03-23 RX ADMIN — CLOPIDOGREL BISULFATE 75 MG: 75 TABLET, FILM COATED ORAL at 10:47

## 2019-03-23 RX ADMIN — PAROXETINE HYDROCHLORIDE 30 MG: 30 TABLET, FILM COATED ORAL at 10:48

## 2019-03-23 RX ADMIN — PANTOPRAZOLE SODIUM 40 MG: 40 TABLET, DELAYED RELEASE ORAL at 10:47

## 2019-03-23 RX ADMIN — Medication 1000 MCG: at 10:48

## 2019-03-23 RX ADMIN — ASPIRIN 81 MG: 81 TABLET, COATED ORAL at 10:47

## 2019-03-23 RX ADMIN — SODIUM CHLORIDE: 9 INJECTION, SOLUTION INTRAVENOUS at 08:18

## 2019-03-23 RX ADMIN — DONEPEZIL HYDROCHLORIDE 5 MG: 5 TABLET, FILM COATED ORAL at 21:16

## 2019-03-23 RX ADMIN — CARVEDILOL 3.12 MG: 3.12 TABLET, FILM COATED ORAL at 21:16

## 2019-03-23 RX ADMIN — CARVEDILOL 3.12 MG: 3.12 TABLET, FILM COATED ORAL at 10:48

## 2019-03-23 RX ADMIN — ISOSORBIDE MONONITRATE 30 MG: 30 TABLET ORAL at 10:48

## 2019-03-23 RX ADMIN — ATORVASTATIN CALCIUM 40 MG: 40 TABLET, FILM COATED ORAL at 10:48

## 2019-03-23 ASSESSMENT — ENCOUNTER SYMPTOMS
EYE REDNESS: 0
COUGH: 0
WHEEZING: 0
DIARRHEA: 0
BACK PAIN: 0
EYE ITCHING: 0
SORE THROAT: 0
NAUSEA: 0
ABDOMINAL PAIN: 0
EYE DISCHARGE: 0
RHINORRHEA: 0
ABDOMINAL DISTENTION: 0
VOMITING: 0
SHORTNESS OF BREATH: 1

## 2019-03-23 ASSESSMENT — PAIN SCALES - GENERAL
PAINLEVEL_OUTOF10: 0
PAINLEVEL_OUTOF10: 0

## 2019-03-23 NOTE — ED NOTES
Pt was brought in by EMS via stretcher c/c of family finding pt slouched over in his recliner after having he said he had to use the restroom, pt was drooling and he was not responding to commands or conversation made by family. Pt's last known well was undetermined by family. Pt was awake and responded to pain, was aware who he was but was unsure of the location and time. Dr. Carolina Steward in at bedside and determined it was NOT to be called out as a stroke alert.       Harleen Shin RN  03/23/19 [id number]

## 2019-03-23 NOTE — ED NOTES
Pt is alert and communicating with nurse more than what he was when he arrived to ED.       Caroline Pfeiffer RN  03/23/19 2944

## 2019-03-23 NOTE — ED PROVIDER NOTES
[facility]  eMERGENCY dEPARTMENT eNCOUnter          CHIEF COMPLAINT       Chief Complaint   Patient presents with    Fatigue    Altered Mental Status       Nurses Notes reviewed and I agreeexcept as noted in the HPI. HISTORY OF PRESENT ILLNESS    Alfred Dave is a 80 y.o. male who presents to Emergency Department via EMS for the evaluation of confusion and shortness of breath onset 12 hours ago. EMS reports pinpoint pupils, no focal weakness and was given one narcan ed route. The patient's son reports increase confusion and general weakness in the last 12 hours. No fever or chills. No fall. Patient has been more sleepy during the day. Family reports that the patient is full code. REVIEW OF SYSTEMS     Review of Systems   Constitutional: Positive for fatigue. Negative for activity change, appetite change, chills and fever. HENT: Negative for congestion, ear discharge, ear pain, hearing loss, nosebleeds, rhinorrhea and sore throat. Eyes: Negative for discharge, redness, itching and visual disturbance. Respiratory: Positive for shortness of breath. Negative for cough and wheezing. Cardiovascular: Negative for chest pain, palpitations and leg swelling. Gastrointestinal: Negative for abdominal distention, abdominal pain, diarrhea, nausea and vomiting. Endocrine: Negative for cold intolerance. Genitourinary: Negative for decreased urine volume, difficulty urinating, dysuria and hematuria. Musculoskeletal: Negative for arthralgias, back pain, joint swelling, myalgias, neck pain and neck stiffness. Skin: Negative for pallor, rash and wound. Allergic/Immunologic: Negative for environmental allergies. Neurological: Positive for weakness (Generalized ). Negative for dizziness, syncope, light-headedness, numbness and headaches. Hematological: Negative for adenopathy. Psychiatric/Behavioral: Positive for confusion. Negative for agitation and suicidal ideas.  The patient is not nervous/anxious. PAST MEDICAL HISTORY    has a past medical history of Alzheimer's dementia, Anxiety, Arthritis, Atrial fibrillation ([address] CAD (coronary artery disease), Dementia, Depression, Fall at home, GERD (gastroesophageal reflux disease), Heart attack ([address] Hernia, Hyperlipidemia, Hypertension, Iron deficiency anemia, Irritable bowel syndrome, Pacemaker, Parkinson disease ([address] and Parkinsons ([address] SURGICAL HISTORY      has a past surgical history that includes Cardiac surgery (age 58); pacemaker placement (age 58, replaced age 80); vascular surgery (age 58 ); Colonoscopy (over ten years ago); hernia repair (Patient unsure ); and back surgery (age [de-identified] ). CURRENT MEDICATIONS       Current Discharge Medication List      CONTINUE these medications which have NOT CHANGED    Details   PARoxetine (PAXIL) 30 MG tablet Take 1 tablet by mouth every morning  Qty: 90 tablet, Refills: 3    Associated Diagnoses: Anxiety      omeprazole (PRILOSEC) 20 MG delayed release capsule Take 1 capsule by mouth daily  Qty: 90 capsule, Refills: 3    Associated Diagnoses: Gastroesophageal reflux disease, esophagitis presence not specified      meclizine (ANTIVERT) 12.5 MG tablet Take 1 tablet by mouth 3 times daily as needed for Dizziness  Qty: 90 tablet, Refills: 6      donepezil (ARICEPT) 5 MG tablet take 1 tablet by mouth at bedtime  Qty: 30 tablet, Refills: 3    Associated Diagnoses: Memory problem      carvedilol (COREG) 3.125 MG tablet Take 1 tablet by mouth 2 times daily (with meals)  Qty: 180 tablet, Refills: 1    Associated Diagnoses: Coronary artery disease involving native coronary artery of native heart without angina pectoris      isosorbide mononitrate (IMDUR) 30 MG extended release tablet Take 1 tablet by mouth daily  Qty: 90 tablet, Refills: 1    Associated Diagnoses: Coronary artery disease involving native coronary artery of native heart without angina pectoris;  Hypertension, unspecified type      lisinopril (PRINIVIL;ZESTRIL) 5 MG tablet Take 1 tablet by mouth daily  Qty: 90 tablet, Refills: 1    Associated Diagnoses: Hypertension, unspecified type      celecoxib (CELEBREX) 100 MG capsule Take 1 capsule by mouth 2 times daily  Qty: 60 capsule, Refills: 6    Associated Diagnoses: Left hip pain      Nutritional Supplements (ENSURE COMPLETE) LIQD Take 1 Can by mouth 2 times daily  Qty: 60 Bottle, Refills: 11    Associated Diagnoses: Decreased appetite      clopidogrel (PLAVIX) 75 MG tablet Take 1 tablet by mouth daily  Qty: 90 tablet, Refills: 1    Associated Diagnoses: Coronary artery disease involving native coronary artery of native heart without angina pectoris      aspirin 81 MG tablet Take 1 tablet by mouth daily  Qty: 30 tablet, Refills: 11    Associated Diagnoses: Coronary artery disease involving native coronary artery of native heart without angina pectoris      nitroGLYCERIN (NITROSTAT) 0.4 MG SL tablet Place 1 tablet under the tongue every 5 minutes as needed for Chest pain  Qty: 25 tablet, Refills: 6      docusate (COLACE, DULCOLAX) 100 MG CAPS Take 100 mg by mouth 2 times daily  Qty: 180 capsule, Refills: 3    Associated Diagnoses: Constipation, unspecified constipation type      tamsulosin (FLOMAX) 0.4 MG capsule Take 1 capsule by mouth daily  Qty: 30 capsule, Refills: 11    Associated Diagnoses: Benign prostatic hyperplasia, presence of lower urinary tract symptoms unspecified, unspecified morphology      Calcium Carbonate Antacid (TUMS PO) Take by mouth 4 times daily      Misc. Devices [facility]) MISC 1 applicator by Does not apply route continuous. Qty: 1 each, Refills: 0      aluminum & magnesium hydroxide-simethicone (MAALOX ADVANCED) 200-200-20 MG/5ML SUSP suspension Take 5 mLs by mouth every 6 hours as needed for Indigestion. Qty: 1 Bottle, Refills: 0      Cyanocobalamin (VITAMIN B-12 CR) 1000 MCG TBCR Take 1,000 mcg by mouth daily.       Cholecalciferol (VITAMIN D-3) 5000 UNITS TABS Take 1 tablet by mouth daily. acetaminophen (TYLENOL) 325 MG tablet Take 2 tablets by mouth every 6 hours as needed for Pain (For mild pain level 1-3 or for fever > 100.5). Qty: 120 tablet, Refills: 0      magnesium hydroxide (MILK OF MAGNESIA) 400 MG/5ML suspension Take 30 mLs by mouth daily as needed for Constipation. multivitamin (THERAGRAN) per tablet Take 1 tablet by mouth daily. Qty: 90 tablet, Refills: 0      atorvastatin (LIPITOR) 40 MG tablet Take 1 tablet by mouth daily  Qty: 30 tablet, Refills: 3      Skin Protectants, Misc. (EUCERIN) cream Apply topically daily for dry skin and itching. Qty: 1 Package, Refills: 0      gabapentin (NEURONTIN) 100 MG capsule Take 1 capsule by mouth 3 times daily for 90 days. Anjali Medina: 90 capsule, Refills: 0    Associated Diagnoses: Left hip pain             ALLERGIES     is allergic to pcn [penicillins] and terramycin [oxytetracycline]. FAMILY HISTORY     indicated that his mother is . He indicated that his father is . family history includes Heart Disease in his father. SOCIAL HISTORY      reports that he quit smoking about 10 years ago. His smoking use included cigars. He quit after 3.00 years of use. He has never used smokeless tobacco. He reports that he does not drink alcohol or use drugs. PHYSICAL EXAM     INITIAL VITALS:  height is 5' 3\" (1.6 m) and weight is 128 lb 1.6 oz (58.1 kg). His oral temperature is 97.7 °F (36.5 °C). His blood pressure is 169/86 (abnormal) and his pulse is 77. His respiration is 18 and oxygen saturation is 94%. Physical Exam   Constitutional: Vital signs are normal. He appears well-developed and well-nourished. He appears lethargic. Non-toxic appearance. HENT:   Head: Normocephalic and atraumatic.    Right Ear: Tympanic membrane and external ear normal.   Left Ear: Tympanic membrane and external ear normal.   Nose: Nose normal.   Mouth/Throat: Oropharynx is clear and moist and mucous membranes are normal. No oropharyngeal exudate, posterior oropharyngeal edema or posterior oropharyngeal erythema. Eyes: Pupils are equal, round, and reactive to light. Conjunctivae and EOM are normal. Right eye exhibits no discharge. Left eye exhibits no discharge. No scleral icterus. Neck: Normal range of motion. Neck supple. No JVD present. No tracheal deviation present. Cardiovascular: Normal rate, regular rhythm, normal heart sounds, intact distal pulses and normal pulses. Exam reveals no gallop and no friction rub. No murmur heard. Pulmonary/Chest: Effort normal and breath sounds normal. No stridor. No respiratory distress. He has no decreased breath sounds. He has no wheezes. He has no rhonchi. He has no rales. Abdominal: Soft. Bowel sounds are normal. He exhibits no distension. There is no tenderness. There is no rebound, no guarding and no CVA tenderness. Musculoskeletal: Normal range of motion. He exhibits no edema or tenderness. Neurological: He appears lethargic. No cranial nerve deficit. He exhibits normal muscle tone. Coordination normal. GCS eye subscore is 2. GCS verbal subscore is 2. GCS motor subscore is 4. Reacts to pain    Skin: Skin is warm and dry. No abrasion, no laceration and no rash noted. He is not diaphoretic. No erythema. Patient is cold and clammy     Psychiatric: He has a normal mood and affect. His behavior is normal. Judgment and thought content normal.   Nursing note and vitals reviewed. DIFFERENTIAL DIAGNOSIS:   Dehydration, metabolic encephalopathy, sepsis, pneumonia, dehydration, influenza, AMI, UTI    DIAGNOSTIC RESULTS     EKG: All EKG's are interpreted by the Emergency Department Physician who either signs or Co-signsthis chart in the absence of a cardiologist.  Arely Cruz.  Rate: 74 bpm  NE interval: * ms  QRS duration: 184 ms  QTc: 515 ms  P-R-T axes: 75, 252, 85  Ventricular-paced rhythm   NO STEMI      RADIOLOGY: non-plain film images(s) such as CT, Ultrasound and MRI are read by the radiologist.    [address] [place] [address]   Final Result      No acute ischemic infarct, hemorrhage, or mass effect. Aging changes as discussed above. **This report has been created using voice recognition software. It may contain minor errors which are inherent in voice recognition technology. **      Final report electronically signed by Dr. Talha Persaud on 3/23/2019 4:40 AM      XR CHEST 1 VW   Final Result      No acute cardiopulmonary disease. **This report has been created using voice recognition software. It may contain minor errors which are inherent in voice recognition technology. **      Final report electronically signed by Dr. Talha Persaud on 3/23/2019 4:31 AM      CT ABDOMEN PELVIS WO CONTRAST Additional Contrast? None    (Results Pending)       []Visualized and interpreted by me   [] Radiologist's Wet Read Report Reviewed   [] Discussed with Radiologist.    Tiara Wilson:   Results for orders placed or performed during the hospital encounter of 03/23/19   Rapid influenza A/B antigens   Result Value Ref Range    Flu A Antigen NEGATIVE NEGATIVE    Flu B Antigen NEGATIVE NEGATIVE   CBC Auto Differential   Result Value Ref Range    WBC 11.9 (H) 4.8 - 10.8 thou/mm3    RBC 4.58 (L) 4.70 - 6.10 mill/mm3    Hemoglobin 15.0 14.0 - 18.0 gm/dl    Hematocrit 46.4 42.0 - 52.0 %    .3 (H) 80.0 - 94.0 fL    MCH 32.8 26.0 - 33.0 pg    MCHC 32.3 32.2 - 35.5 gm/dl    RDW-CV 13.7 11.5 - 14.5 %    RDW-SD 51.0 (H) 35.0 - 45.0 fL    Platelets 054 476 - 062 thou/mm3    MPV 9.9 9.4 - 12.4 fL    Seg Neutrophils 83.3 %    Lymphocytes 6.8 %    Monocytes 9.0 %    Eosinophils 0.1 %    Basophils 0.2 %    Immature Granulocytes 0.6 %    Segs Absolute 9.9 (H) 1.8 - 7.7 thou/mm3    Lymphocytes # 0.8 (L) 1.0 - 4.8 thou/mm3    Monocytes # 1.1 0.4 - 1.3 thou/mm3    Eosinophils # 0.0 0.0 - 0.4 thou/mm3    Basophils # 0.0 0.0 - 0.1 thou/mm3    Immature Grans (Abs) 0.07 0.00 - 0.07 thou/mm3    nRBC 0 /100 wbc   Comprehensive Metabolic Panel   Result Value Ref Range    Glucose 169 (H) 70 - 108 mg/dL    CREATININE 1.7 (H) 0.4 - 1.2 mg/dL    BUN 43 (H) 7 - 22 mg/dL    Sodium 145 135 - 145 meq/L    Potassium 4.2 3.5 - 5.2 meq/L    Chloride 107 98 - 111 meq/L    CO2 23 23 - 33 meq/L    Calcium 10.4 8.5 - 10.5 mg/dL    AST 15 5 - 40 U/L    Alkaline Phosphatase 60 38 - 126 U/L    Total Protein 7.0 6.1 - 8.0 g/dL    Alb 3.6 3.5 - 5.1 g/dL    Total Bilirubin 0.7 0.3 - 1.2 mg/dL    ALT 8 (L) 11 - 66 U/L   Troponin   Result Value Ref Range    Troponin T < 0.010 ng/ml   Brain Natriuretic Peptide   Result Value Ref Range    Pro-BNP 3842.0 (H) 0.0 - 1800.0 pg/mL   Protime-INR   Result Value Ref Range    INR 0.97 0.85 - 1.13   APTT   Result Value Ref Range    aPTT 26.1 22.0 - 38.0 seconds   Lipase   Result Value Ref Range    Lipase 35.9 5.6 - 51.3 U/L   Lactic acid, plasma   Result Value Ref Range    Lactic Acid 2.1 0.5 - 2.2 mmol/L   Blood Gas, Venous   Result Value Ref Range    PH MIXED 7.35 7.31 - 7.41    PCO2, MIXED VENOUS 48 41 - 51 mmhg    PO2, Mixed 55 (H) 25 - 40 mmhg    HCO3, Mixed 26 23 - 28 mmol/l    Base Exc, Mixed -0.2 -2.0 - 3.0 mmol/l    O2 Sat, Mixed 87 %    COLLECTED BY: [id number]     DEVICE Room Air    Procalcitonin   Result Value Ref Range    Procalcitonin 0.11 (H) 0.01 - 0.09 ng/mL   Anion Gap   Result Value Ref Range    Anion Gap 15.0 8.0 - 16.0 meq/L   Glomerular Filtration Rate, Estimated   Result Value Ref Range    Est, Glom Filt Rate 38 (A) ml/min/1.73m2   Osmolality   Result Value Ref Range    Osmolality Calc 303.4 (H) 275.0 - 300 mOsmol/kg   Hemoglobin A1c   Result Value Ref Range    Hemoglobin A1C 5.3 4.4 - 6.4 %    AVERAGE GLUCOSE 99 70 - 126 mg/dL   TSH with Reflex   Result Value Ref Range    TSH 0.932 0.400 - 4.20 uIU/mL   CK   Result Value Ref Range    Total CK 24 (L) 55 - 170 U/L   Phosphorus   Result Value Ref Range    Phosphorus 4.4 2.4 - 4.7 mg/dL   POCT Glucose   Result Value Ref Range    POC Glucose 151 (H) 70 - 108 mg/dl   EKG Emergency   Result Value Ref Range    Ventricular Rate 74 BPM    Atrial Rate 71 BPM    QRS Duration 184 ms    Q-T Interval 464 ms    QTc Calculation (Bazett) 515 ms    P Axis 75 degrees    R Axis -108 degrees    T Axis 85 degrees       EMERGENCY DEPARTMENT COURSE:   Vitals:    Vitals:    03/23/19 0342 03/23/19 0448 03/23/19 0550 03/23/19 0616   BP: 102/64 122/70 134/78 (!) 169/86   Pulse: 73 76 78 77   Resp: 16 16 17 18   Temp: 97.5 °F (36.4 °C)   97.7 °F (36.5 °C)   TempSrc: Rectal   Oral   SpO2: 97% 96% 97% 94%   Weight: 130 lb (59 kg)   128 lb 1.6 oz (58.1 kg)   Height: 5' 3\" (1.6 m)          3:26 AM    Patient is seen and evaluated in a timely fashion. MedicalDecision Making    He has no focal deficits, no suspicion of CVA or TIA. CT head is negative for acute findings. His ED workups are consistent with metabolic encephalopathy likely secondary to OLMAN due to poor eating and drinking. Of note his straight cath does not make enough urine to send for UA. He is given NS bolus 30 ml/kg based on ideal body weight. CT head and chest x-ray have no acute changes. His ED workups do not support this is sepsis. He seems more alert on re-assessment. Discussed and admitted to Hospitalist service. CRITICAL CARE:   None    CONSULTS:  Dr. Ramy Bruce:  None    FINAL IMPRESSION      1. Metabolic encephalopathy    2. OLMAN (acute kidney injury) ([facility] [address]    3.  Altered mental status, unspecified altered mental status type          DISPOSITION/PLAN   Admit    PATIENT REFERRED TO:  Rusty Kapoor MD  [address] [place] [phone]            DISCHARGE MEDICATIONS:  Current Discharge Medication List          (Please note that portions of this note were completed with a voice recognition program.  Efforts were made to edit the dictations but occasionally words aremis-transcribed.)    MD Jaswant Daley,

## 2019-03-23 NOTE — PROGRESS NOTES
history of poor intake, Weight loss, Mild loss of subcutaneous fat, Mild muscle loss    Objective Information:  · Nutrition-Focused Physical Findings: elderly, lives with son, poor dentation noted, answered questions appropriately. BUN: 43, Creatinine: 1.7, POC: 151, A1C: 5.3, BNP: 3842. Meds: humalog, vitamin D, B-12, multivitamin. No edema.   · Wound Type: None  · Current Nutrition Therapies:  · Oral Diet Orders: 2gm Sodium(carb controlled)   · Oral Diet intake: Unable to assess(not documentation yet, patient reports not hungry yet but feels better)  · Oral Nutrition Supplement (ONS) Orders: Standard High Calorie Oral Supplement(chocolate ensure enlive TID)  · ONS intake: Unable to assess(new order)  · Anthropometric Measures:  · Ht: 5' 3\" (160 cm)   · Current Body Wt: 128 lb (58.1 kg)(3/23/19, bedscale, no edema)  · Admission Body Wt: 128 lb (58.1 kg)(3/23/19, bedscale, no edema)  · Usual Body Wt: 134 lb (60.8 kg)(EMR, 3/7/19)  · % Weight Change:  ,  -6# (4% of body weight) lost in 2 weeks  · Ideal Body Wt: 124 lb (56.2 kg), % Ideal Body 103%  · BMI Classification: BMI 18.5 - 24.9 Normal Weight(22.7)    Nutrition Interventions:   Continue current diet, Vitamin Supplement, Start ONS(If po doesn't increase consider liberalize diet, if patient continues with no appetite may need to consider appetite stimulant.)  Continued Inpatient Monitoring, Education not appropriate at this time, Coordination of Care    Nutrition Evaluation:   · Evaluation: Goals set   · Goals: Patient will consume 75% or greater of meals and ONS during LOS    · Monitoring: Meal Intake, Supplement Intake, Diet Tolerance, Skin Integrity, I&O, Mental Status/Confusion, Weight, Pertinent Labs, Monitor Bowel Function      Electronically signed by Bindu Bull RD, ANDREW on 3/23/19 at 8:58 AM    Contact Number: [phone]

## 2019-03-23 NOTE — PROGRESS NOTES
assistance           Functional Mobility  Functional - Mobility Device: Rolling Walker  Activity: To/from bathroom  Assist Level: Minimal assistance  Functional Mobility Comments: mod cues for RW safety and to keep the RW close to him. moderate flexed posture noted throughout. Activity Tolerance:  Activity Tolerance: Patient limited by fatigue    Treatment Initiated:  Pt completed grooming tasks seated EOB with min A. Mi nA for mobility in room with CGA to min A and RW. Mod cues for safe tech. Completed B UE AROM ex x 10 reps with mod encouragement. Sh flexion, horiz ABD, marches, short arc quads. X 10 reps to increase strength . Assessment:     Performance deficits / Impairments: Decreased functional mobility , Decreased safe awareness, Decreased balance, Decreased ADL status, Decreased ROM, Decreased endurance, Decreased high-level IADLs, Decreased strength  Prognosis: Fair    Clinical Decision Making: Clinical Decision making was of Moderate Complexity as the result of analysis of data from a detailed assessment, a consideration of several treatment options, the presence of comorbidities affecting the plan of care and the need for minimal to moderate modifications or assistance required to complete the evaluation. Discharge Recommendations:  Discharge Recommendations: Continue to assess pending progress, Subacute/Skilled Nursing Facility    Patient Education:  Patient Education: OT POC, safe mobility with RW,     Equipment Recommendations:       Safety:  Safety Devices in place: Yes  Type of devices:  All fall risk precautions in place, Gait belt, Call light within reach, Left in chair, Chair alarm in place    Plan:  Times per week: 3-5  Current Treatment Recommendations: Strengthening, Endurance Training, Patient/Caregiver Education & Training, Equipment Evaluation, Education, & procurement, Self-Care / ADL, Balance Training, Functional Mobility Training, Safety Education & Training    Goals:  Patient goals : Go home    Short term goals  Time Frame for Short term goals: by discharge  Short term goal 1: Pt will complete ADL routine with no > min cues for attention to task, min rest breaks to increase endurance for ADL routine  Short term goal 2: Pt will tolerate standi ngx 3 min with SBA to increase endurnance for sinkside ADL routine  Short term goal 3: Pt will ambulate to and from the BR as well as around obstacles with SBA and no > min cues for safe tech with RW   Short term goal 4: Pt will complete UE strengthening ex x 10 reps to increase strength for ADL routine  Long term goals  Time Frame for Long term goals : Not set due to est short LOS    Evaluation Complexity: Based on the findings of patient history, examination, clinical presentation, and decision making during this evaluation, this patient is of medium complexity.

## 2019-03-23 NOTE — ED NOTES
I attempted to collect urine from pt and he was unable to urinate. Will continue to try and get urine. Dr. Ismael Altamirano said to keep trying but it is not necessary for pt care.       Oleksandr Harris RN  03/23/19 4924

## 2019-03-23 NOTE — PLAN OF CARE
H&P Update    Patient's History and Physical from 3/23/2019 was reviewed. Patient examined. Briefly, this is an 81yo M with likely underlying dementia with progression over the last 6 months per son, with recent decreased appetite and worsening confusion. Appears that his decreased PO itnake is leading to mild OLMAN, which likely has worsened his confusion. Will need to involved SW/CM on Monday, as well as palliative care for goals of care. Currently patient is full code. May require placement, as he lives with son. Confusion/Acute Metabolic Encephalopathy - suspect related to dehydration/OLMAN. Also suspect underlying dementia and possible near baseline vs slight progression. There is normal LFTs, TSH, no evidence of UTI or pneumonia, CT head wnl. OLMAN - creat 1.7 on arrival. suspect pre-renal- in setting of poor intake and lisinopril/celebrex use  CAD  Leukocytosis - as above, no clear infectious source IDed. Ischemic Cardiomyopathy - ef 40% per limited echo 2015, 2014 echo showed similar ef of 35-40%, no significant valve disease  Hx of DC-PPM  pAF - not on AC due to fall risk per cardiology note. Now biventricular paced rhythm. Macrocytosis without anemia    There has been the following changes to plan:  -check BMP at 4pm to assess for response to IVF  -Cont IVF for now, 75ml/hr given history of ischemic cardiomyopathy  -Hold Celebrex, lisinopril, and avoid NSAIDs, nephrotoxins  -Repeat echo to assess for worsening EF as potential cardio renal syndrome - last echo 2014  -dialy weights, NA restrict 2g, strict I/o.      Silvia Figueredo

## 2019-03-24 LAB
ANION GAP SERPL CALCULATED.3IONS-SCNC: 10 MEQ/L (ref 8–16)
BUN BLDV-MCNC: 40 MG/DL (ref 7–22)
CALCIUM SERPL-MCNC: 9.6 MG/DL (ref 8.5–10.5)
CHLORIDE BLD-SCNC: 106 MEQ/L (ref 98–111)
CO2: 26 MEQ/L (ref 23–33)
CREAT SERPL-MCNC: 1.3 MG/DL (ref 0.4–1.2)
ERYTHROCYTE [DISTWIDTH] IN BLOOD BY AUTOMATED COUNT: 13.5 % (ref 11.5–14.5)
ERYTHROCYTE [DISTWIDTH] IN BLOOD BY AUTOMATED COUNT: 51.3 FL (ref 35–45)
GFR SERPL CREATININE-BSD FRML MDRD: 52 ML/MIN/1.73M2
GLUCOSE BLD-MCNC: 107 MG/DL (ref 70–108)
GLUCOSE BLD-MCNC: 109 MG/DL (ref 70–108)
GLUCOSE BLD-MCNC: 119 MG/DL (ref 70–108)
GLUCOSE BLD-MCNC: 85 MG/DL (ref 70–108)
GLUCOSE BLD-MCNC: 89 MG/DL (ref 70–108)
HCT VFR BLD CALC: 37.8 % (ref 42–52)
HEMOGLOBIN: 12.1 GM/DL (ref 14–18)
MCH RBC QN AUTO: 32.8 PG (ref 26–33)
MCHC RBC AUTO-ENTMCNC: 32 GM/DL (ref 32.2–35.5)
MCV RBC AUTO: 102.4 FL (ref 80–94)
ORGANISM: ABNORMAL
PLATELET # BLD: 226 THOU/MM3 (ref 130–400)
PMV BLD AUTO: 10.2 FL (ref 9.4–12.4)
POTASSIUM SERPL-SCNC: 4 MEQ/L (ref 3.5–5.2)
RBC # BLD: 3.69 MILL/MM3 (ref 4.7–6.1)
SODIUM BLD-SCNC: 142 MEQ/L (ref 135–145)
URINE CULTURE REFLEX: ABNORMAL
WBC # BLD: 9.1 THOU/MM3 (ref 4.8–10.8)

## 2019-03-24 PROCEDURE — 85027 COMPLETE CBC AUTOMATED: CPT

## 2019-03-24 PROCEDURE — 82948 REAGENT STRIP/BLOOD GLUCOSE: CPT

## 2019-03-24 PROCEDURE — 1200000003 HC TELEMETRY R&B

## 2019-03-24 PROCEDURE — 2580000003 HC RX 258: Performed by: PHYSICIAN ASSISTANT

## 2019-03-24 PROCEDURE — 80048 BASIC METABOLIC PNL TOTAL CA: CPT

## 2019-03-24 PROCEDURE — 97110 THERAPEUTIC EXERCISES: CPT

## 2019-03-24 PROCEDURE — 93010 ELECTROCARDIOGRAM REPORT: CPT | Performed by: INTERNAL MEDICINE

## 2019-03-24 PROCEDURE — 99232 SBSQ HOSP IP/OBS MODERATE 35: CPT | Performed by: INTERNAL MEDICINE

## 2019-03-24 PROCEDURE — 36415 COLL VENOUS BLD VENIPUNCTURE: CPT

## 2019-03-24 PROCEDURE — 97162 PT EVAL MOD COMPLEX 30 MIN: CPT

## 2019-03-24 PROCEDURE — 6370000000 HC RX 637 (ALT 250 FOR IP): Performed by: PHYSICIAN ASSISTANT

## 2019-03-24 RX ORDER — CYANOCOBALAMIN 1000 UG/ML
1000 INJECTION INTRAMUSCULAR; SUBCUTANEOUS ONCE
Status: DISCONTINUED | OUTPATIENT
Start: 2019-03-24 | End: 2019-03-24

## 2019-03-24 RX ADMIN — DOCUSATE SODIUM 100 MG: 100 CAPSULE, LIQUID FILLED ORAL at 20:08

## 2019-03-24 RX ADMIN — PANTOPRAZOLE SODIUM 40 MG: 40 TABLET, DELAYED RELEASE ORAL at 06:00

## 2019-03-24 RX ADMIN — ATORVASTATIN CALCIUM 40 MG: 40 TABLET, FILM COATED ORAL at 10:42

## 2019-03-24 RX ADMIN — PAROXETINE HYDROCHLORIDE 30 MG: 30 TABLET, FILM COATED ORAL at 10:41

## 2019-03-24 RX ADMIN — SODIUM CHLORIDE: 9 INJECTION, SOLUTION INTRAVENOUS at 20:01

## 2019-03-24 RX ADMIN — ASPIRIN 81 MG: 81 TABLET, COATED ORAL at 10:40

## 2019-03-24 RX ADMIN — SODIUM CHLORIDE: 9 INJECTION, SOLUTION INTRAVENOUS at 03:22

## 2019-03-24 RX ADMIN — ISOSORBIDE MONONITRATE 30 MG: 30 TABLET ORAL at 10:41

## 2019-03-24 RX ADMIN — VITAMIN D, TAB 1000IU (100/BT) 5000 UNITS: 25 TAB at 10:42

## 2019-03-24 RX ADMIN — CLOPIDOGREL BISULFATE 75 MG: 75 TABLET, FILM COATED ORAL at 10:41

## 2019-03-24 RX ADMIN — CARVEDILOL 3.12 MG: 3.12 TABLET, FILM COATED ORAL at 20:06

## 2019-03-24 RX ADMIN — Medication 1000 MCG: at 10:42

## 2019-03-24 RX ADMIN — CARVEDILOL 3.12 MG: 3.12 TABLET, FILM COATED ORAL at 10:41

## 2019-03-24 RX ADMIN — DOCUSATE SODIUM 100 MG: 100 CAPSULE, LIQUID FILLED ORAL at 10:40

## 2019-03-24 RX ADMIN — DONEPEZIL HYDROCHLORIDE 5 MG: 5 TABLET, FILM COATED ORAL at 20:06

## 2019-03-24 RX ADMIN — THERA TABS 1 TABLET: TAB at 10:41

## 2019-03-24 ASSESSMENT — PAIN SCALES - GENERAL: PAINLEVEL_OUTOF10: 0

## 2019-03-24 NOTE — PROGRESS NOTES
[facility]  INPATIENT PHYSICAL THERAPY  EVALUATION  [facility] 4A - 4A-15/015-A    Time In: 08  Time Out: 6799  Timed Code Treatment Minutes: 8 Minutes  Minutes: 25          Date: 3/24/2019  Patient Name: Kaur Sher,  Gender:  male        MRN: [id number]  : 3/15/1930  (80 y.o.)  Referral Date : 19   Referring Practitioner: Tracy England PA-C  Diagnosis: Altered mental status  Additional Pertinent Hx: 80 y.o. male who was brought to [facility] by his son with complaints of confusion and altered mental status. Pt lives with son at home who provided the history. Son states that his father has been feeling unwell for the past two weeks, being very lethargic and hardly getting out of bed. He also notes that he has not been eating or drinking well, stating that he thinks he is dehydrated. Pt has not endorsed feeling CP/SOB, and there is no swelling of his lower extremities. Pt denies dysuria/frequency/urgency but endorses diffuse abdominal pain and bouts of dizziness that comes and goes.       Past Medical History:   Diagnosis Date    Alzheimer's dementia     Anxiety     Arthritis     Atrial fibrillation (HCC)     CAD (coronary artery disease)     Dementia     Depression     Fall at home     GERD (gastroesophageal reflux disease)     Heart attack ([address] 13    Hernia     Hyperlipidemia     Hypertension     Iron deficiency anemia     Irritable bowel syndrome     Pacemaker age 58    Parkinson disease ([address]     Parkinsons ([address]      Past Surgical History:   Procedure Laterality Date    BACK SURGERY  age [de-identified]     CARDIAC SURGERY  age 58    COLONOSCOPY  over ten years ago   [phone]  Patient unsure     PACEMAKER PLACEMENT  age 58, replaced age 80    VASCULAR SURGERY  age 58        Restrictions/Precautions:  General Precautions, Fall Risk                            Subjective:  Chart Reviewed: Yes  Patient assessed for rehabilitation services?: Yes  Family / Caregiver Present: No  Subjective: RN approved PT session. Patient in bed upon PT arrival, agreeable to therapy. He states he is cold, but otherwise has no complaints. General:  Overall Orientation Status: Impaired  Orientation Level: Oriented to time, Oriented to person  Follows Commands: Within Functional Limits    Vision: Impaired  Vision Exceptions: (min gaze preference to the right)    [facility]  Hearing Exceptions: Hard of hearing/hearing concerns         Pain:  Denies. Social/Functional History:    Lives With: Son  Type of Home: House  Home Layout: Two level, Performs ADL's on one level, Able to Live on Main level with bedroom/bathroom  Home Access: Stairs to enter with rails  Entrance Stairs - Number of Steps: 2  Home Equipment: Rolling walker     Bathroom Toilet: Standard    Receives Help From: Family  ADL Assistance: Needs assistance  Homemaking Assistance: Needs assistance  Homemaking Responsibilities: No  Ambulation Assistance: Needs assistance  Transfer Assistance: Needs assistance    Active : No  Leisure & Hobbies: \"anything I can\"  Additional Comments: Pt reported not needing assistance for ADLs and ambulation. Son reported someone would follow him as he ambulated to the [facility], Recently started [facility] OT, PT, aide. services.        Objective:       RLE AROM: WNL         LLE AROM : WNL                                  Strength RLE: (grossly 4/5)    Strength LLE: (grossly 4/5)                   Sensation  Overall Sensation Status: WFL(denies numbness and tingling B UEs. )    RLE Tone: Normotonic  LLE Tone: Normotonic       Balance  Posture: Fair  Sitting - Static: Good  Sitting - Dynamic: Good  Standing - Static: Fair  Standing - Dynamic: Fair, -    Supine to Sit: Moderate assistance    Transfers  Sit to Stand: Minimal Assistance(cues for hand placement)  Stand to sit: Minimal Assistance       Ambulation 1  Surface: level tile  Device: Rolling Walker  Assistance: Minimal assistance  Quality of Gait: short step lengths, decreased B step length and foot clearance, unsteady, flexed posture  Distance: 3' to chair. pt declines further ambulation due to fatigue and being cold  Comments: cues for safety with the RW    Exercises:  Comments: Patient completed the following B LE therex for increased strength x 10 reps: heel/toe raises, LAQs and seated marches. Activity Tolerance:  Activity Tolerance: Patient limited by fatigue;Patient limited by endurance      Assessment: Body structures, Functions, Activity limitations: Decreased functional mobility , Decreased endurance, Decreased strength, Decreased balance, Decreased safe awareness  Assessment: Patient tolerated PT session fairly well. He currently requires min-mod A to safely complete moblity due to decreased strength, balance and endurance. He will benefit from continued inpatient PT in order to maximize his safety and functional independence, and decrease his risk for falls. Prognosis: Good    Clinical Presentation: Moderate - Evolving with Changing Characteristics: . Due to an analysis of data from a detailed assessment, a consideration of several treatment options, the presence of co morbidities that affect the POC, and the need for minimal to moderate modifications or assistance needed to complete the evaluation. Decision Making: Moderate Complexitybased on patient assessment and decision making process of determining plan of care and establishing reasonable expectations for measurable functional outcomes    REQUIRES PT FOLLOW UP: Yes    Discharge Recommendations:  Discharge Recommendations: Continue to assess pending progress, Subacute/Skilled Nursing Facility    Patient Education:  Patient Education: Role of PT, POC, transfers, safety, LE therex    Equipment Recommendations:  Equipment Needed: No    Safety:  Type of devices:  All fall risk precautions in place, Left in chair, Call light within reach, Nurse notified, Chair alarm in place, Gait belt    Plan:  Times per week: 3-5x GM  Times per day: Daily  Current Treatment Recommendations: Strengthening, Gait Training, Patient/Caregiver Education & Training, Equipment Evaluation, Education, & procurement, Balance Training, Functional Mobility Training, Endurance Training, Transfer Training, Safety Education & Training    Goals:  Patient goals : Get stronger  Short term goals  Time Frame for Short term goals: 2 weeks  Short term goal 1: Patient will transfer supine <--> sit with SBA in order to get into/out of bed. Short term goal 2: Patient will transfer sit <--> stand with SBA in order to get up to ambulate. Short term goal 3: Patient will ambulate >50 with a RW and SBA for household distances. Long term goals  Time Frame for Long term goals : no LTGs due to short ELOS    Evaluation Complexity: Based on the findings of patient history, examination, clinical presentation, and decision making during this evaluation, the evaluation of Alfred Messina  is of medium complexity.             AM-PAC Inpatient Mobility without Stair Climbing Raw Score : 15  AM-PAC Inpatient without Stair Climbing T-Scale Score : 43.03  Mobility Inpatient CMS 0-100% Score: 47.43  Mobility Inpatient without Stair CMS G-Code Modifier : ARANZA Haas, PT, DPT

## 2019-03-24 NOTE — PROGRESS NOTES
Hospitalist Progress Note    Patient:  Alfred Sherman      Unit/Bed:4A-15/015-A    YOB: 1930    MRN: [id number]       Acct: [de-identified]     PCP: Mervat Max MD    Date of Admission: 3/23/2019      Assessment/Plan:  Active Hospital Problems    Diagnosis Date Noted    Altered mental status [R41.82] 03/23/2019    OLMAN (acute [address]) [address] [address] [N17.9] 03/23/2019    Generalized abdominal pain [R10.84] 03/23/2019       Confusion/Acute Metabolic Encephalopathy - suspect related to dehydration/OLMAN. Also suspect underlying dementia and possible near baseline. There is normal LFTs, TSH, no evidence of UTI or pneumonia, CT head wnl. Improved today. OLMAN - creat 1.7 on arrival. suspect pre-renal- in setting of poor intake and lisinopril/celebrex use. Holding ACE/Nsaid, and with fluids this is improving. 1.3 this am.  Continue gentle fluids. CAD - cont home regimen of asa, imdur, coreg, plavix, statin. Leukocytosis - as above, no clear infectious source IDed. Resolved, liekly hemoconcentration (all cell lines down slightly). Ischemic Cardiomyopathy - ef 40% per limited echo 2015, 2014 echo showed similar ef of 35-40%, no significant valve disease-dialy weights, NA restrict 2g, strict I/o. Repeat echo shows 50-55% ef and G1DD, which is improved from previous reports. Hx of DC-PPM  pAF - not on AC due to fall risk per cardiology note. Now biventricular paced rhythm. Macrocytosis with mild anemia - normal B12 and folate.   Continue b12 supplementation        Expected discharge date:  1-2 days    Disposition: Will confer with HOLLEY/TOBI on Monday for placement, suspect SNF         [] Home       [] TCU       [] Rehab       [] Psych       [] SNF       [] [place]       [] Other-    --------------------------------------------    Chief Complaint: Confusion    Hospital Course:  Alfred Sherman is an 81yo M with likely underlying dementia with progression over the last 6 months per son, with recent decreased appetite and worsening confusion. He was subsequently brought in by his son, with whom he lives with. Appears that his decreased PO itnake is leading to mild OLMAN, which likely has worsened his confusion. Will need to involved SW/CM on Monday, as well as palliative care for goals of care. Currently patient is full code. May require placement. Subjective (past 24 hours): Patient seen at bedside, no family in room at time of examination. Denies any complaints at this time. Specifically, no chest pain, SOB, n/v/d/c. Is alert. Medications:  Reviewed    Infusion Medications    sodium chloride 75 mL/hr at 03/24/19 0322    dextrose       Scheduled Medications    aspirin  81 mg Oral Daily    atorvastatin  40 mg Oral Daily    carvedilol  3.125 mg Oral BID WC    vitamin D  5,000 Units Oral Daily    clopidogrel  75 mg Oral Daily    vitamin B-12  1,000 mcg Oral Daily    docusate sodium  100 mg Oral BID    donepezil  5 mg Oral Nightly    isosorbide mononitrate  30 mg Oral Daily    multivitamin  1 tablet Oral Daily    pantoprazole  40 mg Oral QAM AC    PARoxetine  30 mg Oral QAM    insulin lispro  0-6 Units Subcutaneous TID WC    insulin lispro  0-3 Units Subcutaneous Nightly     PRN Meds: acetaminophen, magnesium hydroxide, meclizine, nitroGLYCERIN, glucose, dextrose, glucagon (rDNA), dextrose      Intake/Output Summary (Last 24 hours) at 3/24/2019 1655  Last data filed at 3/24/2019 1400  Gross per 24 hour   Intake 1826.33 ml   Output --   Net 1826.33 ml     Weight change: -4 lb 3.2 oz (-1.905 kg)      Exam:  BP (!) 124/57   Pulse 71   Temp 97.3 °F (36.3 °C) (Oral)   Resp 18   Ht 5' 3\" (1.6 m)   Wt 125 lb 12.8 oz (57.1 kg)   SpO2 96%   BMI 22.28 kg/m²     General appearance: No apparent distress. Elderly cachectic male. Eyes:  Pupils equal, round, and reactive to light. Conjunctivae/corneas clear. HENT: Head normal in appearance.  External nares normal. Oral mucosa moist without lesions. Hearing grossly intact. Neck: Supple, with full range of motion. No jugular venous distention. Trachea midline. Respiratory:  Normal respiratory effort. Clear to auscultation, bilaterally without rales or wheezes or Rhonchi. Cardiovascular: Normal rate, regular rhythm with normal S1/S2 without murmurs. No lower extremity edema. Abdomen: Soft, non-tender, non-distended with normal bowel sounds. Musculoskeletal: Normal range of motion in extremities. There is no joint swelling or tenderness. Skin: Skin color, texture, turgor normal.  No rashes or lesions. Neurologic:  Neurovascularly intact without any focal sensory/motor deficits in the upper and lower extremities. Cranial nerves:  grossly non-focal.  Psychiatric: Alert and oriented to self/birthdate and that this is hospital, thought content and insight impaired  Capillary Refill: Brisk,< 3 seconds. Peripheral Pulses: +2 palpable, equal bilaterally. Labs:   Recent Labs     03/23/19  0340 03/24/19  0327   WBC 11.9* 9.1   HGB 15.0 12.1*   HCT 46.4 37.8*    226     Recent Labs     03/23/19  0340 03/23/19  1628 03/24/19  0327    141 142   K 4.2 4.1 4.0    104 106   CO2 23 22* 26   BUN 43* 39* 40*   CREATININE 1.7* 1.4* 1.3*   CALCIUM 10.4 9.8 9.6   PHOS 4.4  --   --      Recent Labs     03/23/19  0340   AST 15   ALT 8*   BILITOT 0.7   ALKPHOS 60     Recent Labs     03/23/19  0340   INR 0.97     Recent Labs     03/23/19  0340   CKTOTAL 24*       Microbiology:      Urinalysis:      Lab Results   Component Value Date    NITRU NEGATIVE 03/23/2019    WBCUA 10-15 03/23/2019    WBCUA 0-2 06/07/2012    BACTERIA FEW 03/23/2019    RBCUA 0-2 03/23/2019    BLOODU TRACE 03/23/2019    SPECGRAV 1.019 06/19/2014    GLUCOSEU NEGATIVE 03/23/2019       Radiology:  CT ABDOMEN PELVIS WO CONTRAST Additional Contrast? None   Final Result      1. No evidence of nephrolithiasis or hydronephrosis.  No ureterolithiasis or hydroureter. 2. Multiple low-density lesions within the kidneys. Some of these are too small adequately characterize. However, the larger lesions measure fluid attenuation. While these may represent cysts, these are incompletely characterized on the current    noncontrast examination. One of the cysts demonstrates mild complexity with punctate peripheral calcification. 3.There is a small hyperdense focus within the lateral aspect of the right kidney on axial image 37 measuring approximately 3 mm in diameter. This is too small adequately characterize. 4. Consider renal lesion protocol cross-sectional CT or MR imaging for further characterization. 5. Diffuse circumferential bladder wall thickening of indeterminate etiology. This may be related to incomplete distention versus cystitis. Correlate clinically. 6. Extensive diverticular disease throughout the entire colon. No definite evidence of diverticulitis is seen however. **This report has been created using voice recognition software. It may contain minor errors which are inherent in voice recognition technology. **      Final report electronically signed by Dr. Gennaro Velarde on 3/23/2019 5:54 PM      CT HEAD WO CONTRAST   Final Result      No acute ischemic infarct, hemorrhage, or mass effect. Aging changes as discussed above. **This report has been created using voice recognition software. It may contain minor errors which are inherent in voice recognition technology. **      Final report electronically signed by Dr. Iram Mendosa on 3/23/2019 4:40 AM      XR CHEST 1 VW   Final Result      No acute cardiopulmonary disease. **This report has been created using voice recognition software. It may contain minor errors which are inherent in voice recognition technology. **      Final report electronically signed by Dr. Iram Mendosa on 3/23/2019 4:31 AM          DVT prophylaxis: [] Lovenox [x] SCDs                                 [] SQ Heparin                                 [] Encourage ambulation           [] Already on Anticoagulation     Code Status: Full Code    PT/OT Eval Status: yes    Diet:   DIET CARB CONTROL; Low Sodium (2 GM)  Dietary Nutrition Supplements: Standard High Calorie Oral Supplement    Fluids: Gentle fluids    Tele:   [x] yes             [] no      Electronically signed by Silvia Figueredo DO on 3/24/2019 at 4:55 PM

## 2019-03-25 LAB
ANION GAP SERPL CALCULATED.3IONS-SCNC: 9 MEQ/L (ref 8–16)
BUN BLDV-MCNC: 31 MG/DL (ref 7–22)
CALCIUM SERPL-MCNC: 9.4 MG/DL (ref 8.5–10.5)
CHLORIDE BLD-SCNC: 110 MEQ/L (ref 98–111)
CO2: 25 MEQ/L (ref 23–33)
CREAT SERPL-MCNC: 0.9 MG/DL (ref 0.4–1.2)
GFR SERPL CREATININE-BSD FRML MDRD: 79 ML/MIN/1.73M2
GLUCOSE BLD-MCNC: 95 MG/DL (ref 70–108)
POTASSIUM SERPL-SCNC: 4.2 MEQ/L (ref 3.5–5.2)
SODIUM BLD-SCNC: 144 MEQ/L (ref 135–145)

## 2019-03-25 PROCEDURE — 99232 SBSQ HOSP IP/OBS MODERATE 35: CPT | Performed by: INTERNAL MEDICINE

## 2019-03-25 PROCEDURE — 97530 THERAPEUTIC ACTIVITIES: CPT

## 2019-03-25 PROCEDURE — 6370000000 HC RX 637 (ALT 250 FOR IP): Performed by: PHYSICIAN ASSISTANT

## 2019-03-25 PROCEDURE — 1200000003 HC TELEMETRY R&B

## 2019-03-25 PROCEDURE — 2580000003 HC RX 258: Performed by: PHYSICIAN ASSISTANT

## 2019-03-25 PROCEDURE — 80048 BASIC METABOLIC PNL TOTAL CA: CPT

## 2019-03-25 PROCEDURE — 97116 GAIT TRAINING THERAPY: CPT

## 2019-03-25 PROCEDURE — 2709999900 HC NON-CHARGEABLE SUPPLY

## 2019-03-25 PROCEDURE — 36415 COLL VENOUS BLD VENIPUNCTURE: CPT

## 2019-03-25 RX ADMIN — CLOPIDOGREL BISULFATE 75 MG: 75 TABLET, FILM COATED ORAL at 10:11

## 2019-03-25 RX ADMIN — DONEPEZIL HYDROCHLORIDE 5 MG: 5 TABLET, FILM COATED ORAL at 19:45

## 2019-03-25 RX ADMIN — THERA TABS 1 TABLET: TAB at 10:11

## 2019-03-25 RX ADMIN — PAROXETINE HYDROCHLORIDE 30 MG: 30 TABLET, FILM COATED ORAL at 10:10

## 2019-03-25 RX ADMIN — DOCUSATE SODIUM 100 MG: 100 CAPSULE, LIQUID FILLED ORAL at 10:10

## 2019-03-25 RX ADMIN — SODIUM CHLORIDE: 9 INJECTION, SOLUTION INTRAVENOUS at 10:11

## 2019-03-25 RX ADMIN — PANTOPRAZOLE SODIUM 40 MG: 40 TABLET, DELAYED RELEASE ORAL at 04:01

## 2019-03-25 RX ADMIN — CARVEDILOL 3.12 MG: 3.12 TABLET, FILM COATED ORAL at 10:11

## 2019-03-25 RX ADMIN — CARVEDILOL 3.12 MG: 3.12 TABLET, FILM COATED ORAL at 17:44

## 2019-03-25 RX ADMIN — DOCUSATE SODIUM 100 MG: 100 CAPSULE, LIQUID FILLED ORAL at 19:45

## 2019-03-25 RX ADMIN — Medication 1000 MCG: at 10:11

## 2019-03-25 RX ADMIN — VITAMIN D, TAB 1000IU (100/BT) 5000 UNITS: 25 TAB at 10:11

## 2019-03-25 RX ADMIN — ATORVASTATIN CALCIUM 40 MG: 40 TABLET, FILM COATED ORAL at 10:11

## 2019-03-25 RX ADMIN — ACETAMINOPHEN 650 MG: 325 TABLET ORAL at 04:01

## 2019-03-25 RX ADMIN — ACETAMINOPHEN 650 MG: 325 TABLET ORAL at 19:45

## 2019-03-25 RX ADMIN — ISOSORBIDE MONONITRATE 30 MG: 30 TABLET ORAL at 10:11

## 2019-03-25 RX ADMIN — ASPIRIN 81 MG: 81 TABLET, COATED ORAL at 10:11

## 2019-03-25 ASSESSMENT — PAIN DESCRIPTION - PAIN TYPE
TYPE: CHRONIC PAIN
TYPE: ACUTE PAIN

## 2019-03-25 ASSESSMENT — PAIN SCALES - GENERAL
PAINLEVEL_OUTOF10: 3
PAINLEVEL_OUTOF10: 0
PAINLEVEL_OUTOF10: 4

## 2019-03-25 ASSESSMENT — PAIN DESCRIPTION - LOCATION
LOCATION: HIP
LOCATION: HIP

## 2019-03-25 ASSESSMENT — PAIN DESCRIPTION - ORIENTATION
ORIENTATION: LEFT
ORIENTATION: RIGHT

## 2019-03-25 ASSESSMENT — PAIN DESCRIPTION - DESCRIPTORS
DESCRIPTORS: SORE
DESCRIPTORS: SORE

## 2019-03-25 ASSESSMENT — PAIN DESCRIPTION - FREQUENCY
FREQUENCY: INTERMITTENT
FREQUENCY: INTERMITTENT

## 2019-03-25 NOTE — PROGRESS NOTES
Hospitalist Progress Note    Patient:  Alfred Terrell      Unit/Bed:4A-15/015-A    YOB: 1930    MRN: [id number]       Acct: [de-identified]     PCP: Nora Lloyd MD    Date of Admission: 3/23/2019      Assessment/Plan:  Active Hospital Problems    Diagnosis Date Noted    Altered mental status [R41.82] 03/23/2019    OLMAN (acute kidney injury) ([facility] [address] [N17.9] 03/23/2019    Generalized abdominal pain [R10.84] 03/23/2019       Confusion/Acute Metabolic Encephalopathy - suspect related to dehydration/OLMAN. Also suspect underlying dementia and possible near baseline. There is normal LFTs, TSH, no evidence of UTI or pneumonia, CT head wnl. Resolved  OLMAN - creat 1.7 on arrival. suspect pre-renal- in setting of poor intake and lisinopril/celebrex use. Holding ACE/Nsaid, and with fluids this is improving -> resolved, 0.9 this am.   CAD - cont home regimen of asa, imdur, coreg, plavix, statin. Leukocytosis - as above, no clear infectious source IDed. Resolved, liekly hemoconcentration (all cell lines down slightly). Ischemic Cardiomyopathy - ef 40% per limited echo 2015, 2014 echo showed similar ef of 35-40%, no significant valve disease-dialy weights, NA restrict 2g, strict I/o. Repeat echo shows 50-55% ef and G1DD, which is improved from previous reports. Hx of DC-PPM  pAF - not on AC due to fall risk per cardiology note. Now biventricular paced rhythm. Monitor  Macrocytosis with mild anemia - normal B12 and folate. Continue b12 supplementation        Expected discharge date:  Pending precert    Disposition:          [] Home       [x] TCU       [] Rehab       [] Psych       [x] SNF       [] [place]       [] Other-    --------------------------------------------    Chief Complaint: Confusion    Hospital Course:  Leonora Rodriguez is an 79yo M with likely underlying dementia with progression over the last 6 months per son, with recent decreased appetite and worsening confusion.   He was subsequently brought in by his son, with whom he lives with. Appears that his decreased PO itnake is leading to mild OLMAN, which likely has worsened his confusion. 3/25 - Palliative care discussed code status with patient and son, agreed to change to Limited no x4. Also, are working for [employer] at [employer]. Subjective (past 24 hours): Patient seen at bedside, son at bedside. No new complaints. Soraya function is back to baseline. No sob, cough, fevers. Mentation stable. Medications:  Reviewed    Infusion Medications    dextrose       Scheduled Medications    aspirin  81 mg Oral Daily    atorvastatin  40 mg Oral Daily    carvedilol  3.125 mg Oral BID WC    vitamin D  5,000 Units Oral Daily    clopidogrel  75 mg Oral Daily    vitamin B-12  1,000 mcg Oral Daily    docusate sodium  100 mg Oral BID    donepezil  5 mg Oral Nightly    isosorbide mononitrate  30 mg Oral Daily    multivitamin  1 tablet Oral Daily    pantoprazole  40 mg Oral QAM AC    PARoxetine  30 mg Oral QAM     PRN Meds: acetaminophen, magnesium hydroxide, meclizine, nitroGLYCERIN, glucose, dextrose, glucagon (rDNA), dextrose      Intake/Output Summary (Last 24 hours) at 3/25/2019 1651  Last data filed at 3/25/2019 0334  Gross per 24 hour   Intake 988.99 ml   Output --   Net 988.99 ml     Weight change: 5 lb 12.8 oz (2.631 kg)      Exam:  BP (!) 159/66   Pulse 69   Temp 97.2 °F (36.2 °C) (Oral)   Resp 16   Ht 5' 3\" (1.6 m)   Wt 131 lb 9.6 oz (59.7 kg)   SpO2 96%   BMI 23.31 kg/m²     General appearance: No apparent distress. Elderly cachectic male. Eyes:  Pupils equal, round, and reactive to light. Conjunctivae/corneas clear. HENT: Head normal in appearance. External nares normal.  Oral mucosa moist without lesions. Hearing grossly intact. Neck: Supple, with full range of motion. No jugular venous distention. Trachea midline. Respiratory:  Normal respiratory effort.  Clear to auscultation, bilaterally without rales or wheezes or Rhonchi. Cardiovascular: Normal rate, regular rhythm with normal S1/S2 without murmurs. No lower extremity edema. Abdomen: Soft, non-tender, non-distended with normal bowel sounds. Musculoskeletal: Normal range of motion in extremities. There is no joint swelling or tenderness. Skin: Skin color, texture, turgor normal.  No rashes or lesions. Neurologic:  Neurovascularly intact without any focal sensory/motor deficits in the upper and lower extremities. Cranial nerves:  grossly non-focal.  Psychiatric: Alert and oriented to self/birthdate and that this is hospital, thought content and insight impaired  Capillary Refill: Brisk,< 3 seconds. Peripheral Pulses: +2 palpable, equal bilaterally. Labs:   Recent Labs     03/23/19  0340 03/24/19  0327   WBC 11.9* 9.1   HGB 15.0 12.1*   HCT 46.4 37.8*    226     Recent Labs     03/23/19  0340 03/23/19  1628 03/24/19  0327 03/25/19  0417    141 142 144   K 4.2 4.1 4.0 4.2    104 106 110   CO2 23 22* 26 25   BUN 43* 39* 40* 31*   CREATININE 1.7* 1.4* 1.3* 0.9   CALCIUM 10.4 9.8 9.6 9.4   PHOS 4.4  --   --   --      Recent Labs     03/23/19  0340   AST 15   ALT 8*   BILITOT 0.7   ALKPHOS 60     Recent Labs     03/23/19  0340   INR 0.97     Recent Labs     03/23/19  0340   CKTOTAL 24*       Microbiology:      Urinalysis:      Lab Results   Component Value Date    NITRU NEGATIVE 03/23/2019    WBCUA 10-15 03/23/2019    WBCUA 0-2 06/07/2012    BACTERIA FEW 03/23/2019    RBCUA 0-2 03/23/2019    BLOODU TRACE 03/23/2019    SPECGRAV 1.019 06/19/2014    GLUCOSEU NEGATIVE 03/23/2019       Radiology:  CT ABDOMEN PELVIS WO CONTRAST Additional Contrast? None   Final Result      1. No evidence of nephrolithiasis or hydronephrosis. No ureterolithiasis or hydroureter. 2. Multiple low-density lesions within the kidneys. Some of these are too small adequately characterize.  However, the larger lesions measure fluid attenuation. While these may represent cysts, these are incompletely characterized on the current    noncontrast examination. One of the cysts demonstrates mild complexity with punctate peripheral calcification. 3.There is a small hyperdense focus within the lateral aspect of the right kidney on axial image 37 measuring approximately 3 mm in diameter. This is too small adequately characterize. 4. Consider renal lesion protocol cross-sectional CT or MR imaging for further characterization. 5. Diffuse circumferential bladder wall thickening of indeterminate etiology. This may be related to incomplete distention versus cystitis. Correlate clinically. 6. Extensive diverticular disease throughout the entire colon. No definite evidence of diverticulitis is seen however. **This report has been created using voice recognition software. It may contain minor errors which are inherent in voice recognition technology. **      Final report electronically signed by Dr. May Gallagher on 3/23/2019 5:54 PM      CT HEAD WO CONTRAST   Final Result      No acute ischemic infarct, hemorrhage, or mass effect. Aging changes as discussed above. **This report has been created using voice recognition software. It may contain minor errors which are inherent in voice recognition technology. **      Final report electronically signed by Dr. Barb Tran on 3/23/2019 4:40 AM      XR CHEST 1 VW   Final Result      No acute cardiopulmonary disease. **This report has been created using voice recognition software. It may contain minor errors which are inherent in voice recognition technology. **      Final report electronically signed by Dr. Barb Tran on 3/23/2019 4:31 AM          DVT prophylaxis: [] Lovenox                                  [x] SCDs                                 [] SQ Heparin                                 [] Encourage ambulation           [] Already on Anticoagulation     Code Status: Limited    PT/OT Eval Status: yes    Diet:   DIET CARB CONTROL; Low Sodium (2 GM)  Dietary Nutrition Supplements: Standard High Calorie Oral Supplement    Fluids: stopped fluids    Tele:   [x] yes             [] no      Electronically signed by Diomedes Martin DO on 3/25/2019 at 4:51 PM

## 2019-03-25 NOTE — PLAN OF CARE
Problem: Falls - Risk of:  Goal: Will remain free from falls  Description  Will remain free from falls  Outcome: Met This Shift  Note:   Free from falls this shift, at this time. Call light and bedside table within reach. Bed alarm on. Bed wheels locked and bed in lowest position. Pt oriented to own abilities and is encouraged to use call light for needs. Problem: Falls - Risk of:  Goal: Absence of physical injury  Description  Absence of physical injury  Outcome: Met This Shift  Note:   No physical injury noted. Problem: Risk for Impaired Skin Integrity  Goal: Tissue integrity - skin and mucous membranes  Description  Structural intactness and normal physiological function of skin and  mucous membranes. Outcome: Met This Shift    Care plan reviewed with patient. Patient verbalizes understanding of the plan of care and contributed to goal setting.

## 2019-03-25 NOTE — PROGRESS NOTES
[facility]           Progress Note    Patient Name:  [facility] Record Number:  [id number]  YOB: 1930    Date:  3/25/2019  Time:  12:33 PM  Completed By:  Cam Naqvi RN    Reason for Palliative Care Evaluation: Palliative care eval received to discuss goals of care. Pt was admitted with OLMAN and dehydration. PMH includes CAD and dementia. Current Issues:    [x]  Pain- pt's son states that pt has chronic hip pain. []  Fatigue  []  Nausea  []  Anxiety  []  Depression  []  Shortness of Breath  []  Constipation  []  Appetite  []  Other:    Advance Directives  [] [facility] DNR Form  [] Living Will  [] Medical POA    Current Code Status  [x] Full Resuscitation  [] DNR-Comfort Care-Arrest  [] DNR-Comfort Care  [] Limited   [] No CPR   [] No shock   [] No ET intubation/reintubation   [] No resuscitative medications   [] Other limitation:    Performance Status: 30%    Family/Patient Discussion:  Palliative care in to see pt. Pt resting quietly in bed, is awake and alert. Symptoms reviewed, pt states that he feels \"pretty good\". Pt knows that he is in the hospital, but not which one, he does not know why he is here. Pt recognizes his son, Autumn Mendez, who is here. Pt not oriented to time. Discussion with pt's son, Autumn Mendez regarding pt's overall condition, Autumn Mendez reports that in the past few weeks pt has stopped eating. Autumn Mendez states that pt also has declined cognitively. One month ago pt had  been able to get himself into a wheelchair and to the bathroom and back by himself. Autumn Mendez states that in the past several weeks, pt also is now fully dependant in ADL's. Autumn Mendez states that he now has to take pt to the bathroom and direct him step-by-step. Auutmn Mendez states that pt does not connect pulling his pants up after using the bathroom, that he must be told each task to do. Autumn Mendez states that pt seems \"lost\" and he feels that pt has \"given up\".     Supportive discussion on goals of care, in light of pt's general decline. We reviewed that there isn't an acute medical cause for decline and may be natural progression of dementia. We discussed that pt will need rehab and see how he responds. We discussed that family will know in the coming weeks if pt will progress but if he continues to not eat, this will be his cycle. Cassie Owens states that if that happens, the family will not do \"whatever it takes\" to keep pt alive. Cassie Owens states that comfort will be the goal and allow natural death. Cassie Owens asks about hospice care, brief explanation of that. Code status also explained, with questions answered, Cassie Owens states that pt is not to resuscitated on put on \" life support\" if either would be needed. If that happens, pt is to be immediately transitioned to comfort care, consult hospice and allow natural death. Current care is to remain conservative. Conversation concluded with all questions answered and Cassie Owens expressing understanding and no further questions. Support and encouragement given. DR Mervat Rogers is on unit, above conversation discussed with him, he changed pt's code status to Limited, no intubation, no CPR, no shock and no emergency meds. Plan/Follow-Up:  Code status changed to Limited, nox4. Plan of care is to continue with conservative treatment. Rehab for pt at discharge is tentative plan, if pt is not able to progress, plan will be to transition to hospice care at home. Pt lives with his sons. Palliative care will continue to follow and assist with advance care planning as appropriate. Pt's nurse,CARIDAD Aviles updated.      Joanie Polanco RN  3/25/2019,  12:33 PM

## 2019-03-25 NOTE — CARE COORDINATION
3/25/19, 12:29 PM  Alfred Cash       Admitted from: ED 3/23/2019/ 0316 Hospital day: 2   Location: -15/015-A Reason for admit: Altered mental status [R41.82] Status: inpt  Admit order signed?: yes  PMH:  has a past medical history of Alzheimer's dementia, Anxiety, Arthritis, Atrial fibrillation ([address] CAD (coronary artery disease), Dementia, Depression, Fall at home, GERD (gastroesophageal reflux disease), Heart attack ([address] Hernia, Hyperlipidemia, Hypertension, Iron deficiency anemia, Irritable bowel syndrome, Pacemaker, Parkinson disease ([address] and Parkinsons ([address] Hx Alzheimer's dementia. Medications:  Scheduled Meds:   aspirin  81 mg Oral Daily    atorvastatin  40 mg Oral Daily    carvedilol  3.125 mg Oral BID WC    vitamin D  5,000 Units Oral Daily    clopidogrel  75 mg Oral Daily    vitamin B-12  1,000 mcg Oral Daily    docusate sodium  100 mg Oral BID    donepezil  5 mg Oral Nightly    isosorbide mononitrate  30 mg Oral Daily    multivitamin  1 tablet Oral Daily    pantoprazole  40 mg Oral QAM AC    PARoxetine  30 mg Oral QAM    insulin lispro  0-6 Units Subcutaneous TID WC    insulin lispro  0-3 Units Subcutaneous Nightly     Continuous Infusions:   dextrose        Pertinent Info/Orders/Treatment Plan: Pt admitted for AMS. CT head (-), CT abd reviewed. PT/OT consulted, palliative care consulted and plan of care is conservative. Pt is a limited code. Diet: DIET CARB CONTROL; Low Sodium (2 GM)  Dietary Nutrition Supplements: Standard High Calorie Oral Supplement   Vital Signs: BP (!) 159/66   Pulse 69   Temp 97.2 °F (36.2 °C) (Oral)   Resp 16   Ht 5' 3\" (1.6 m)   Wt 131 lb 9.6 oz (59.7 kg)   SpO2 96%   BMI 23.31 kg/m²   PCP: Brant Cervantes MD - last visit on 3/7 Dr. Jessica Hernandez reports pt has not been to PCP in several months and has not followed up on many health maintenance recommendations.    Readmission: no  Readmission Risk Score: 14%    Discharge Planning  Current Residence: Private Residence  Living Arrangements:  Children   Support Systems:  Children, Family Members, Friends/Neighbors  Current Services PTA:     Potential Assistance Needed:  Extended [facility], Outpatient PT/OT  Potential Assistance Purchasing Medications:  No  Does patient want to participate in local refill/ meds to beds program?  Not Assessed  Type of Home Care Services:  None  Patient expects to be discharged to:  home with family  Expected Discharge date:  03/27/19  Follow Up Appointment: Best Day/ Time: Monday AM    Discharge Plan: Pt from home with sons, has a walker and wheelchair, and home health at home. Pt confused this am, PT/OT recommend ECF,  spoke with family, [facility] is preferred.      Evaluation: yes

## 2019-03-26 ENCOUNTER — TELEPHONE (OUTPATIENT)
Dept: FAMILY MEDICINE CLINIC | Age: 84
End: 2019-03-26

## 2019-03-26 VITALS
DIASTOLIC BLOOD PRESSURE: 70 MMHG | WEIGHT: 130.5 LBS | SYSTOLIC BLOOD PRESSURE: 149 MMHG | RESPIRATION RATE: 16 BRPM | BODY MASS INDEX: 23.12 KG/M2 | OXYGEN SATURATION: 95 % | HEART RATE: 72 BPM | TEMPERATURE: 98.6 F | HEIGHT: 63 IN

## 2019-03-26 PROBLEM — E86.0 DEHYDRATION: Status: ACTIVE | Noted: 2019-03-26

## 2019-03-26 PROCEDURE — 99238 HOSP IP/OBS DSCHRG MGMT 30/<: CPT | Performed by: INTERNAL MEDICINE

## 2019-03-26 PROCEDURE — 97110 THERAPEUTIC EXERCISES: CPT

## 2019-03-26 PROCEDURE — 97116 GAIT TRAINING THERAPY: CPT

## 2019-03-26 PROCEDURE — 6370000000 HC RX 637 (ALT 250 FOR IP): Performed by: PHYSICIAN ASSISTANT

## 2019-03-26 RX ORDER — AZELASTINE HYDROCHLORIDE 0.5 MG/ML
1 SOLUTION/ DROPS OPHTHALMIC 2 TIMES DAILY PRN
COMMUNITY

## 2019-03-26 RX ADMIN — ACETAMINOPHEN 650 MG: 325 TABLET ORAL at 01:57

## 2019-03-26 RX ADMIN — Medication 1000 MCG: at 09:07

## 2019-03-26 RX ADMIN — ATORVASTATIN CALCIUM 40 MG: 40 TABLET, FILM COATED ORAL at 09:04

## 2019-03-26 RX ADMIN — CARVEDILOL 3.12 MG: 3.12 TABLET, FILM COATED ORAL at 09:04

## 2019-03-26 RX ADMIN — PANTOPRAZOLE SODIUM 40 MG: 40 TABLET, DELAYED RELEASE ORAL at 05:16

## 2019-03-26 RX ADMIN — ACETAMINOPHEN 650 MG: 325 TABLET ORAL at 17:12

## 2019-03-26 RX ADMIN — PAROXETINE HYDROCHLORIDE 30 MG: 30 TABLET, FILM COATED ORAL at 09:06

## 2019-03-26 RX ADMIN — CLOPIDOGREL BISULFATE 75 MG: 75 TABLET, FILM COATED ORAL at 09:04

## 2019-03-26 RX ADMIN — ASPIRIN 81 MG: 81 TABLET, COATED ORAL at 09:03

## 2019-03-26 RX ADMIN — ISOSORBIDE MONONITRATE 30 MG: 30 TABLET ORAL at 09:06

## 2019-03-26 RX ADMIN — DOCUSATE SODIUM 100 MG: 100 CAPSULE, LIQUID FILLED ORAL at 09:05

## 2019-03-26 RX ADMIN — CARVEDILOL 3.12 MG: 3.12 TABLET, FILM COATED ORAL at 16:56

## 2019-03-26 RX ADMIN — THERA TABS 1 TABLET: TAB at 09:06

## 2019-03-26 RX ADMIN — VITAMIN D, TAB 1000IU (100/BT) 5000 UNITS: 25 TAB at 09:07

## 2019-03-26 RX ADMIN — ACETAMINOPHEN 650 MG: 325 TABLET ORAL at 09:08

## 2019-03-26 ASSESSMENT — PAIN DESCRIPTION - PAIN TYPE: TYPE: CHRONIC PAIN

## 2019-03-26 ASSESSMENT — PAIN SCALES - GENERAL
PAINLEVEL_OUTOF10: 0
PAINLEVEL_OUTOF10: 3
PAINLEVEL_OUTOF10: 3
PAINLEVEL_OUTOF10: 7
PAINLEVEL_OUTOF10: 0
PAINLEVEL_OUTOF10: 0
PAINLEVEL_OUTOF10: 4

## 2019-03-26 ASSESSMENT — PAIN DESCRIPTION - DESCRIPTORS: DESCRIPTORS: SORE

## 2019-03-26 ASSESSMENT — PAIN DESCRIPTION - FREQUENCY: FREQUENCY: INTERMITTENT

## 2019-03-26 ASSESSMENT — PAIN DESCRIPTION - LOCATION
LOCATION: HIP
LOCATION: HIP

## 2019-03-26 ASSESSMENT — PAIN DESCRIPTION - ORIENTATION: ORIENTATION: LEFT

## 2019-03-26 NOTE — PROGRESS NOTES
CLINICAL PHARMACY: DISCHARGE MED RECONCILIATION/REVIEW    [facility]) Select Patient?: No  Total # of Interventions Recommended: 0   -   Total # Interventions Accepted: 0  Intervention Severity:   - Level 1 Intervention Present?: No   - Level 2 #: 0   - Level 3 #: 0   Time Spent (min): 15    Additional Documentation:  Patient to be discharged to F. AVS reviewed.

## 2019-03-26 NOTE — DISCHARGE SUMMARY
Hospital Medicine Discharge Summary      Patient Identification:   Devyn Fam   : 3/15/1930  MRN: [id number]   Account: [de-identified]      Patient's PCP: Archie Ballard MD    Admit Date: 3/23/2019     Discharge Date:   3/26/2019    Admitting Physician: Sheree Ortez DO     Discharge Physician: Ella Casanova DO       Hospital Course:   Alfred Dhillon is a 80 y.o. male admitted to [facility] on 3/23/2019 for acute confusion, decreased appetite by. The patient has a PMHx of dementia, CAD, ischemic cardiomyopathy, pAF (not on AC due to fall risk), and hx ppm.  He was brought by his son (lives with patient) after noting decreased PO intake over the last 2 weeks and the patient more lethargic and not acting himself. Upon arrival, patient was found to have OLMAN with creatinine of 1.7. This was likely prerenal, in setting of poor oral intake, lisinopril use, and celebrex use. These medications were stopped, and the patient was hydrated intraveniously. Over 2 days his creatining improved to his baseline. His acute confusional state was likely precipitated by his OLMAN/dehydration. He was back towards his baseline after hydration, per discussion with the son. There was normal LFTs, TSH, no evidence of UTI or pneumonia, CT head wnl. PT worked with patient, and recommended subacute/skilled facility. SW/CM met with pateint's son, and patient will be discharged to SNF for ongoing therapy. An echo was obtained to rule out cardiorenal syndrome as a cause of his worsening creatinine, and actually showed normal ef (improved from 40% in 2015). Thus, upon discharge, he had no ongoing indication for ACE/ARB, so felt discontinuation could be done without plans to restart in the immediate future. Recommend repeat BMP in 1 week to ensure renal function remains stable, and recommend avoiding NSAIDs. [facility] care met with patient/family to discuss code status and overall prognosis of dementia.   The son agreed to change code status to [facility]. Appropriate changes were made in the chart. Please see chart for more detailed hospital course. Discharge Diagnoses:    · Acute metabolic encephalopathy  · OLMAN  · Dehydration  · Poor oral intake /   · CAD  · Ischemic Cardiomyopathy - history of reduced EF, now recovered  · Macrocytosis with mild anemia, stable  · Paroxysmal AF off of AC due to risk of fall/bleed  · Severe malnutrition with evidence of muscle wasting. Nutrition consulted and followed patient. The patient was seen and examined on day of discharge and this discharge summary is in conjunction with any daily progress note from day of discharge. Exam:     Vitals:  Vitals:    03/26/19 0515 03/26/19 0845 03/26/19 1147 03/26/19 1615   BP: (!) 147/71 (!) 185/92 (!) 141/65 (!) 149/70   Pulse: 70 70 70 72   Resp: 18 16 16 16   Temp: 98.3 °F (36.8 °C) 98.5 °F (36.9 °C) 97.9 °F (36.6 °C) 98.6 °F (37 °C)   TempSrc: Oral Oral Oral Oral   SpO2: 93% 95% 93% 95%   Weight: 130 lb 8 oz (59.2 kg)      Height:         Weight: Weight: 130 lb 8 oz (59.2 kg)     24 hour intake/output:    Intake/Output Summary (Last 24 hours) at 3/26/2019 1700  Last data filed at 3/26/2019 1504  Gross per 24 hour   Intake 2481 ml   Output --   Net 2481 ml       General appearance: No apparent distress. Elderly cachectic male. Eyes:  Pupils equal, round, and reactive to light. Conjunctivae/corneas clear. HENT: Head normal in appearance. External nares normal.  Oral mucosa moist without lesions. Hearing grossly intact. Neck: Supple, with full range of motion. No jugular venous distention. Trachea midline. Respiratory:  Normal respiratory effort. Clear to auscultation, bilaterally without rales or wheezes or Rhonchi. Cardiovascular: Normal rate, regular rhythm with normal S1/S2 without murmurs. No lower extremity edema. Abdomen: Soft, non-tender, non-distended with normal bowel sounds.   Musculoskeletal: Normal range of motion in extremities. There is no joint swelling or tenderness. Skin: Skin color, texture, turgor normal.  No rashes or lesions. Neurologic:  Neurovascularly intact without any focal sensory/motor deficits in the upper and lower extremities. Cranial nerves:  grossly non-focal.  Psychiatric: Alert and oriented to self/birthdate and that this is hospital, thought content and insight impaired          Labs: For convenience and continuity at follow-up the following most recent labs are provided:      CBC:    Lab Results   Component Value Date    WBC 9.1 03/24/2019    HGB 12.1 03/24/2019    HCT 37.8 03/24/2019     03/24/2019       Renal:    Lab Results   Component Value Date     03/25/2019    K 4.2 03/25/2019     03/25/2019    CO2 25 03/25/2019    BUN 31 03/25/2019    CREATININE 0.9 03/25/2019    CALCIUM 9.4 03/25/2019    PHOS 4.4 03/23/2019         Significant Diagnostic Studies    Radiology:   CT ABDOMEN PELVIS WO CONTRAST Additional Contrast? None   Final Result      1. No evidence of nephrolithiasis or hydronephrosis. No ureterolithiasis or hydroureter. 2. Multiple low-density lesions within the kidneys. Some of these are too small adequately characterize. However, the larger lesions measure fluid attenuation. While these may represent cysts, these are incompletely characterized on the current    noncontrast examination. One of the cysts demonstrates mild complexity with punctate peripheral calcification. 3.There is a small hyperdense focus within the lateral aspect of the right kidney on axial image 37 measuring approximately 3 mm in diameter. This is too small adequately characterize. 4. Consider renal lesion protocol cross-sectional CT or MR imaging for further characterization. 5. Diffuse circumferential bladder wall thickening of indeterminate etiology. This may be related to incomplete distention versus cystitis. Correlate clinically.       6. Extensive acetaminophen 325 MG tablet  Commonly known as:  TYLENOL  Take 2 tablets by mouth every 6 hours as needed for Pain (For mild pain level 1-3 or for fever > 100.5). aluminum & magnesium hydroxide-simethicone 200-200-20 MG/5ML Susp suspension  Commonly known as:  MAALOX ADVANCED  Take 5 mLs by mouth every 6 hours as needed for Indigestion. aspirin 81 MG tablet  Take 1 tablet by mouth daily     atorvastatin 40 MG tablet  Commonly known as:  LIPITOR  Take 1 tablet by mouth daily     azelastine 0.05 % ophthalmic solution  Commonly known as:  OPTIVAR     carvedilol 3.125 MG tablet  Commonly known as:  COREG  Take 1 tablet by mouth 2 times daily (with meals)     clopidogrel 75 MG tablet  Commonly known as:  PLAVIX  Take 1 tablet by mouth daily     docusate 100 MG Caps  Commonly known as:  COLACE, DULCOLAX  Take 100 mg by mouth 2 times daily     donepezil 5 MG tablet  Commonly known as:  ARICEPT  take 1 tablet by mouth at bedtime     ENSURE COMPLETE Liqd  Take 1 Can by mouth 2 times daily     eucerin cream  Apply topically daily for dry skin and itching. isosorbide mononitrate 30 MG extended release tablet  Commonly known as:  IMDUR  Take 1 tablet by mouth daily     magnesium hydroxide 400 MG/5ML suspension  Commonly known as:  MILK OF MAGNESIA     meclizine 12.5 MG tablet  Commonly known as:  ANTIVERT  Take 1 tablet by mouth 3 times daily as needed for Dizziness     multivitamin per tablet  Take 1 tablet by mouth daily.      nitroGLYCERIN 0.4 MG SL tablet  Commonly known as:  NITROSTAT  Place 1 tablet under the tongue every 5 minutes as needed for Chest pain     omeprazole 20 MG delayed release capsule  Commonly known as:  PRILOSEC  Take 1 capsule by mouth daily     PARoxetine 30 MG tablet  Commonly known as:  PAXIL  Take 1 tablet by mouth every morning     tamsulosin 0.4 MG capsule  Commonly known as:  FLOMAX  Take 1 capsule by mouth daily     TUMS PO     vitamin B-12 1000 MCG extended release tablet vitamin D-3 5000 units Tabs  Commonly known as:  cholecalciferol     Wheelchair Misc  1 applicator by Does not apply route continuous. STOP taking these medications    celecoxib 100 MG capsule  Commonly known as:  CELEBREX     gabapentin 100 MG capsule  Commonly known as:  NEURONTIN     lisinopril 5 MG tablet  Commonly known as:  PRINIVIL;ZESTRIL                   Time Spent on discharge is roughly 20 minutes in the examination, evaluation, counseling and review of medications and discharge plan. Thank you Zoë Hernandes MD for the opportunity to be involved in this patient's care.     Signed:    Electronically signed by Domitila Gautam DO on 3/26/2019 at 5:00 PM

## 2019-03-26 NOTE — CARE COORDINATION
3/26/19, 11:37 AM    Discharge plan discussed by  and . Discharge plan reviewed with patient/ family. Patient/ family verbalize understanding of discharge plan and are in agreement with plan. Understanding was demonstrated using the teach back method. Services After Discharge  Services At/After Discharge: Nursing Services, Skilled Therapy, Aide Services, In ambulance([facility]/ [facility])   IMM Letter  IMM Letter given to Patient/Family/Significant other/Guardian/POA/by[de-identified] CM  IMM Letter date given[de-identified] 03/26/19  IMM Letter time given[de-identified] 3050     Gene will be discharged to [facility] today. He will go skilled under his Medicare benefit. He will be transported by [facility] ambulance. Transport forms and discharge instructions have been place on patients chart. HOLLEY spoke with patients son [facility] and made him aware of discharge. He is agreeable. Enrike Gutierrez at [facility] is aware of potential discharge time. Teena Le is a 43 

## 2019-03-26 NOTE — DISCHARGE INSTR - COC
Continuity of Care Form    Patient Name: Heladio Nolasco   :  3/15/1930  MRN:  [id number]    [facility] date:  3/23/2019  Discharge date:  3/26/19    Code Status Order: Limited   Advance Directives:   Advance Care Flowsheet Documentation     Date/Time Healthcare Directive Type of Healthcare Directive Copy in [address] Agent's Name Healthcare Agent's Phone Number    [phone]  Unknown, patient unable to respond due to medical condition -- -- -- -- --          Admitting Physician:  Dequan Thompson DO  PCP: Rusty Kapoor MD    [facility] 4A-15/015-A  Discharging Unit Phone Number: [phone]    Emergency Contact:   Extended Emergency Contact Information  Primary Emergency Contact: Blank Angel  Address: [address] [place] [address] Phone: [phone]  Relation: Child  Secondary Emergency Contact: Lakesha Gilbert [address] Phone: [phone]  Relation: Child    Past Surgical History:  Past Surgical History:   Procedure Laterality Date    BACK SURGERY  age [de-identified]     CARDIAC SURGERY  age 58    COLONOSCOPY  over ten years ago   [address]  Patient unsure     PACEMAKER PLACEMENT  age 58, replaced age 80    VASCULAR SURGERY  age 58        Immunization History:   Immunization History   Administered Date(s) Administered    Influenza Vaccine, unspecified formulation 10/10/2014    Influenza Virus Vaccine 10/21/2012    Influenza, High Dose (Fluzone 72 yrs and older) 2018    Influenza, Gabby Celestin, 3 Years and older, IM (Fluzone 3 yrs and older or Afluria 5 yrs and older) 10/24/2017    Pneumococcal 13-valent Conjugate (Ovfgjsq02) 2017    Pneumococcal Polysaccharide (Vvcwjuwru49) 2018    Tdap (Boostrix, Adacel) 2015       Active Problems:  Patient Active Problem List   Diagnosis Code    Esophagitis K20.9    Dementia F03.90  CAD (coronary artery disease) I25.10    NSTEMI (non-ST elevated myocardial infarction) ([facility]) I21.4    Generalized weakness R53.1    [facility] [employer] dual pacemaker Z95.0    Anxiety F41.9    Poor appetite R63.0    Ataxic gait R26.0    S/P CABG x 3 Z95.1    Cardiomyopathy ([facility]) ejection 35 % 40%  I42.9    Gastroesophageal reflux disease K21.9    Paroxysmal atrial fibrillation ([facility]) I48.0    Altered mental status R41.82    OLMAN (acute kidney injury) ([facility] [address] N17.9    Generalized abdominal pain R10.84       Isolation/Infection:   Isolation          No Isolation            Nurse Assessment:  Last Vital Signs: BP (!) 185/92   Pulse 70   Temp 98.5 °F (36.9 °C) (Oral)   Resp 16   Ht 5' 3\" (1.6 m)   Wt 130 lb 8 oz (59.2 kg)   SpO2 95%   BMI 23.12 kg/m²     Last documented pain score (0-10 scale): Pain Level: 3  Last Weight:   Wt Readings from Last 1 Encounters:   03/26/19 130 lb 8 oz (59.2 kg)     Mental Status:  disoriented. Oriented to person, month and place    IV Access:  - None    Nursing Mobility/ADLs:  Walking   Assisted  Transfer  Assisted  Bathing  Assisted  Dressing  Assisted  Toileting  Assisted  Feeding  Independent  Med Admin  Assisted  Med Delivery   whole    Wound Care Documentation and Therapy:        Elimination:  Continence:   · Bowel: No  · Bladder: at times  Urinary Catheter: None   Colostomy/Ileostomy/Ileal Conduit: No       Date of Last BM: 3/23    Intake/Output Summary (Last 24 hours) at 3/26/2019 1057  Last data filed at 3/26/2019 0518  Gross per 24 hour   Intake 2001 ml   Output --   Net 2001 ml     I/O last 3 completed shifts: In: 2001 [P.O.:980; I.V.:1021]  Out: -     Safety Concerns: At Risk for Falls    Impairments/Disabilities:      None    Nutrition Therapy:  Current Nutrition Therapy:   - Oral Diet:  General    Routes of Feeding: Oral  Liquids:  Thin Liquids  Daily Fluid Restriction: no  Last Modified Barium Swallow with Video (Video Swallowing Test): not done    Treatments at the Time of Hospital Discharge:   Respiratory Treatments: N/A  Oxygen Therapy:  is not on home oxygen therapy. Ventilator:    - No ventilator support    Rehab Therapies: Physical Therapy and Occupational Therapy  Weight Bearing Status/Restrictions: No weight bearing restirctions  Other Medical Equipment (for information only, NOT a DME order):  walker  Other Treatments: N/A    Patient's personal belongings (please select all that are sent with patient):  None    RN SIGNATURE:  Electronically signed by Natalie Ulloa RN on 3/26/19 at 11:34 AM    CASE MANAGEMENT/SOCIAL WORK SECTION    Inpatient Status Date: 3/23/2019    Readmission Risk Assessment Score:  Readmission Risk              Risk of Unplanned Readmission:        12           Discharging to Facility/ Agency   · Name:   · [address] [place] [address], [facility], [address] [place] [address]  · Phone:[phone]  · Fax:[phone]    Dialysis Facility (if applicable)   · Name:  · Address:  · Dialysis Schedule:  · Phone:  · Fax:    / signature: Electronically signed by Lilia Lam. HAMIDA Hightower on 3/26/19 at 11:11 AM    PHYSICIAN SECTION    Prognosis: Guarded    Condition at Discharge: Stable    Rehab Potential (if transferring to Rehab): Fair    Recommended Labs or Other Treatments After Discharge:   1. BMP in one week to reassess creatinine  2. Stopped lisinopril and celebrex given OLMAN, can continue tylenol for pain  3. Stopped gabapentin for encephalopathy, consider reinitiating at nighttime if restarted  4. Monitor BP, if elevated would recommend amlodipine low dose and titrated if necessary. Would avoid ACE, ARB and thiazides given risk for dehydration/OLMAN. Physician Certification: I certify the above information and transfer of Alfred Muro Navi  is necessary for the continuing treatment of the diagnosis listed and that he requires Vince Terry for greater 30 days.      Update Admission H&P: No change in H&P    PHYSICIAN SIGNATURE:  Electronically signed by John Roca DO on 3/26/19 at 11:38 AM

## 2019-03-26 NOTE — PROGRESS NOTES
Pharmacy Medication History Note      List of current medications patient is taking is complete. Source of information: Patient, sure source fill history, [facility] office - updated list 3/7    Changes made to medication list:  Medications removed (include reason, ex. therapy complete or physician discontinued):  None     Medications added/doses adjusted:  Azelastine 0.05% ophthalmic drops - 1 drop twice daily as needed for itchy eyes    Other notes (ex. Recent course of antibiotics, Coumadin dosing):  Denies use of other OTC or herbal medications.       Allergies reviewed      Electronically signed by Gordy Heaton on 3/26/2019 at 9:52 AM

## 2019-03-26 NOTE — CARE COORDINATION
3/26/19, 10:44 AM    DISCHARGE BARRIERS  HOLLEY spoke with Nicole from [facility] this morning. They are unable to accept Gene today because they do not have any beds. HOLLEY called Elena Perez from [facility] to make referral as it was families next choice. Spoke with Gene to update him and he is agreeable to go to [facility] if he is accepted. HOLLEY called and left a message for son asking him to call back so HOLLEY could update. Received a call from [facility] and they are able to accept Gene at discharge.

## 2019-03-26 NOTE — PROGRESS NOTES
Consult received Chart reviewed. Plan is for patient to go [facility] today.

## 2019-03-26 NOTE — PROGRESS NOTES
[address] 4A - 4A-15/015-A    Time In: 6039  Time Out: 6382  Timed Code Treatment Minutes: 26 Minutes  Minutes: 26          Date: 3/26/2019  Patient Name: Michelle Chiang,  Gender:  male        MRN: [id number]  : 3/15/1930  (80 y.o.)  Referral Date : 19  Referring Practitioner: Wesly Avalos PA-C  Diagnosis: Altered mental status  Additional Pertinent Hx: 80 y.o. male who was brought to [place] [address] by his son with complaints of confusion and altered mental status. Pt lives with son at home who provided the history. Son states that his father has been feeling unwell for the past two weeks, being very lethargic and hardly getting out of bed. He also notes that he has not been eating or drinking well, stating that he thinks he is dehydrated. Pt has not endorsed feeling CP/SOB, and there is no swelling of his lower extremities. Pt denies dysuria/frequency/urgency but endorses diffuse abdominal pain and bouts of dizziness that comes and goes.       Past Medical History:   Diagnosis Date    Alzheimer's dementia     Anxiety     Arthritis     Atrial fibrillation (HCC)     CAD (coronary artery disease)     Dementia     Depression     Fall at home     GERD (gastroesophageal reflux disease)     Heart attack ([address] 13    Hernia     Hyperlipidemia     Hypertension     Iron deficiency anemia     Irritable bowel syndrome     Pacemaker age 58    Parkinson disease ([address]     Parkinsons ([address]      Past Surgical History:   Procedure Laterality Date    BACK SURGERY  age [de-identified]     CARDIAC SURGERY  age 58    COLONOSCOPY  over ten years ago   [address]  Patient unsure     PACEMAKER PLACEMENT  age 58, replaced age 80    VASCULAR SURGERY  age 58        Restrictions/Precautions:  General Precautions, Fall Risk           Prior Level of Function:  ADL Assistance: Needs assistance  Homemaking Assistance: Needs assistance  Ambulation Assistance: Needs assistance  Transfer Assistance: Needs assistance  Additional Comments: Pt reported not needing assistance for ADLs and ambulation. Son reported someone would follow him as he ambulated to the [facility], Recently started [facility] OT, PT, aide. services. Subjective:     Subjective: pt in bed on arrival needed encouargement to participate and get up for therapy, pt was incont of urine at start of session encouraged him to walk to bathroom, pt stated that he does spend most of time in bed at home and uses w/c most of time even in home but can walk short distance     Pain:   .  Pain Assessment  Pain Level: 0 left hip pain w/ wbing activity       Social/Functional:  Lives With: Son  Type of Home: House  Home Layout: Two level, Performs ADL's on one level, Able to Live on Main level with bedroom/bathroom  Home Access: Stairs to enter with rails  Entrance Stairs - Number of Steps: 2  Home Equipment: Rolling walker     Objective:  Supine to Sit: Minimal assistance(at trunk and min assist to scoot to edge of bed )    Transfers  Sit to Stand: Minimal Assistance(from bed cues for hand placement and from toilet )  Stand to sit: Minimal Assistance(cues to reach back and control descend)       Ambulation 1  Device: Rolling Walker  Assistance: Minimal assistance  Quality of Gait: slow magdalena and flexed posture decreased step length and heel strike at vicky LEs needing assist to keep walker closer to him   Distance: 10x2         Balance  Comments: standing balance w/ one UE at support with CGA however he needed assist for change of attends and clean up       Exercises:  Exercises  Comments: pt completed ankle pumps, glut sets, heelslides, hip abd/add, short arc quads x 10 reps he needed max cues to follow along and complete ex correctly                 Activity Tolerance:  Activity Tolerance: Patient limited by fatigue;Patient limited by endurance    Assessment:   Body structures, Functions, Activity

## 2019-03-28 LAB
BLOOD CULTURE, ROUTINE: NORMAL
BLOOD CULTURE, ROUTINE: NORMAL

## 2019-04-16 NOTE — TELEPHONE ENCOUNTER
Garrison Mireles from [facility] called stating that the patient was discharged from [facility] to home. She will be faxing orders for PT, OT, and a  consult to all be signed. Jacey Medeiros states that the patient's son requested the  consult as they had questions about community resources and are having financial trouble. They will be visiting him 2 times a week for the PT and OT.

## 2019-04-23 NOTE — TELEPHONE ENCOUNTER
We received a phone call from Blane Agarwal at [facility] stating that pt last several nights has been waking up having hallucinations. Last night was the worse the episode lasted till 4am seeing things and talking to people. Pts family wanted us to be aware and see if something can be done to help the pt sleep. Pt also made an apt for Thursday to be seen. Please advise.      Call nurse Silver Olvera at [phone]

## 2019-04-23 NOTE — TELEPHONE ENCOUNTER
OT also called from [facility] home health called stating they are seeing the patient 1 time weekly.

## 2019-04-24 NOTE — LETTER
[address] [address].  Phone: [phone]  Fax: [phone]    Marylen Mons, MD        May 8, 2019       Patient: Tomás Benavides   MR Number: [id number]   YOB: 1930   Date of Visit: 4/24/2019       Dear Dr. Macario Logan: Thank you for the request for consultation for Alfred Cardoso to me for the evaluation of hip pain. Below are the relevant portions of my assessment and plan of care. If you have questions, please do not hesitate to call me. I look forward to following Alfred along with you.     Sincerely,        Marvin Billy MD    CC providers:  Aashish Stahl MD  [address] [place]

## 2019-04-24 NOTE — PROGRESS NOTES
ChiefComplaint: Left hip    Hip Pain    Incident onset: Patient is a 81 y/o comes with Left hip pain. Patient had a fall 2013 and injured left hip. . Patient had X-rays and no fracture. Patient states over time pain has worsen. Injury mechanism: Multiple falls. The pain is present in the left hip. The quality of the pain is described as aching and stabbing. The pain is at a severity of 8/10. The pain has been constant since onset. Associated symptoms include an inability to bear weight and a loss of motion. Pertinent negatives include no numbness. The symptoms are aggravated by weight bearing. XR LEFT HIP 03/10/2019:  FINDINGS: There is no fracture or dislocation. Joint space narrowing and sclerosis are present at the femoral acetabular joint. Bones are osteopenic. Degenerative changes in the lower lumbar spine are incompletely visualized. Atherosclerotic vascular    cascade lesions are noted.           Impression   1. No fracture or dislocation. 2. Osteoarthritis of the hip. The patient is allergic to pcn [penicillins] and terramycin [oxytetracycline]. PastMedical History  Alfred  has a past medical history of Alzheimer's dementia, Anxiety, Arthritis, Atrial fibrillation ([address] CAD (coronary artery disease), Dementia, Depression, Fall at home, GERD (gastroesophageal reflux disease), Heart attack ([address] Hernia, Hyperlipidemia, Hypertension, Iron deficiency anemia, Irritable bowel syndrome, Pacemaker, Parkinson disease ([address] and Parkinsons ([address] Past Surgical History  The patient  has a past surgical history that includes Cardiac surgery (age 58); pacemaker placement (age 58, replaced age 80); vascular surgery (age 58 ); Colonoscopy (over ten years ago); hernia repair (Patient unsure ); and back surgery (age [de-identified] ). Family History  This patient's family history includes Heart Disease in his father. Social History  Alfred  reports that he has never smoked.  He has never used smokeless tobacco. He reports that he does not drink alcohol or use drugs. Medications    Current Outpatient Medications:     azelastine (OPTIVAR) 0.05 % ophthalmic solution, 1 drop 2 times daily as needed (for itchy eyes), Disp: , Rfl:     atorvastatin (LIPITOR) 40 MG tablet, Take 1 tablet by mouth daily, Disp: 30 tablet, Rfl: 3    Skin Protectants, Misc. (EUCERIN) cream, Apply topically daily for dry skin and itching., Disp: 1 Package, Rfl: 0    PARoxetine (PAXIL) 30 MG tablet, Take 1 tablet by mouth every morning, Disp: 90 tablet, Rfl: 3    omeprazole (PRILOSEC) 20 MG delayed release capsule, Take 1 capsule by mouth daily, Disp: 90 capsule, Rfl: 3    meclizine (ANTIVERT) 12.5 MG tablet, Take 1 tablet by mouth 3 times daily as needed for Dizziness, Disp: 90 tablet, Rfl: 6    donepezil (ARICEPT) 5 MG tablet, take 1 tablet by mouth at bedtime, Disp: 30 tablet, Rfl: 3    carvedilol (COREG) 3.125 MG tablet, Take 1 tablet by mouth 2 times daily (with meals), Disp: 180 tablet, Rfl: 1    isosorbide mononitrate (IMDUR) 30 MG extended release tablet, Take 1 tablet by mouth daily, Disp: 90 tablet, Rfl: 1    Nutritional Supplements (ENSURE COMPLETE) LIQD, Take 1 Can by mouth 2 times daily, Disp: 60 Bottle, Rfl: 11    clopidogrel (PLAVIX) 75 MG tablet, Take 1 tablet by mouth daily, Disp: 90 tablet, Rfl: 1    aspirin 81 MG tablet, Take 1 tablet by mouth daily, Disp: 30 tablet, Rfl: 11    nitroGLYCERIN (NITROSTAT) 0.4 MG SL tablet, Place 1 tablet under the tongue every 5 minutes as needed for Chest pain, Disp: 25 tablet, Rfl: 6    docusate (COLACE, DULCOLAX) 100 MG CAPS, Take 100 mg by mouth 2 times daily, Disp: 180 capsule, Rfl: 3    tamsulosin (FLOMAX) 0.4 MG capsule, Take 1 capsule by mouth daily, Disp: 30 capsule, Rfl: 11    Calcium Carbonate Antacid (TUMS PO), Take by mouth 4 times daily, Disp: , Rfl:     Misc. Devices [facility]) MISC, 1 applicator by Does not apply route continuous. , Disp: 1 each, Rfl: 0    aluminum & Negative for food allergies and immunocompromised state. Neurological: Negative for dizziness, tremors, seizures, syncope, facial asymmetry, speech difficulty, weakness, light-headedness, numbness and headaches. Hematological: Does not bruise/bleed easily. Psychiatric/Behavioral: Positive for sleep disturbance. Negative for agitation, behavioral problems, confusion, decreased concentration, dysphoric mood, hallucinations, self-injury and suicidal ideas. The patient is nervous/anxious. The patient is not hyperactive. Objective:     Vitals:    04/24/19 1256   BP: 138/70   Site: Left Upper Arm   Position: Sitting   Cuff Size: Medium Adult   Pulse: 71   Weight: 125 lb (56.7 kg)   Height: 5' 3\" (1.6 m)       Physical Exam   Constitutional: He is oriented to person, place, and time. He appears well-developed and well-nourished. No distress. HENT:   Head: Normocephalic and atraumatic. Right Ear: External ear normal.   Left Ear: External ear normal.   Nose: Nose normal.   Mouth/Throat: Oropharynx is clear and moist. No oropharyngeal exudate. Eyes: Pupils are equal, round, and reactive to light. Conjunctivae and EOM are normal. Right eye exhibits no discharge. Left eye exhibits no discharge. No scleral icterus. Neck: Normal range of motion and full passive range of motion without pain. Neck supple. No muscular tenderness present. No neck rigidity. No edema, no erythema and normal range of motion present. No thyromegaly present. Cardiovascular: Normal rate, regular rhythm, normal heart sounds and intact distal pulses. Exam reveals no gallop and no friction rub. No murmur heard. Pulmonary/Chest: Effort normal and breath sounds normal. No respiratory distress. He has no wheezes. He has no rales. He exhibits no tenderness. Abdominal: Soft. Bowel sounds are normal. He exhibits no distension. There is no tenderness. There is no rebound and no guarding.    Musculoskeletal:        Right hip: He exhibits no tenderness. Left hip: He exhibits decreased range of motion and tenderness. Right knee: Tenderness found. Medial joint line tenderness noted. Left knee: Tenderness found. Medial joint line tenderness noted. Right ankle: He exhibits no swelling. Left ankle: He exhibits no swelling. Cervical back: He exhibits decreased range of motion, tenderness and pain. Thoracic back: He exhibits tenderness. Lumbar back: He exhibits decreased range of motion, tenderness and pain. Back:         Right foot: There is no swelling. Left foot: There is no swelling. Neurological: He is alert and oriented to person, place, and time. He has normal strength and normal reflexes. He is not disoriented. He displays no atrophy. No cranial nerve deficit or sensory deficit. He exhibits normal muscle tone. He displays a negative Romberg sign. Gait abnormal. Coordination normal. He displays no Babinski's sign on the right side. He displays no Babinski's sign on the left side. Skin: Skin is warm. No rash noted. He is not diaphoretic. No erythema. No pallor. Psychiatric: His mood appears not anxious. His affect is not angry, not blunt, not labile and not inappropriate. His speech is not rapid and/or pressured, not delayed, not tangential and not slurred. He is not agitated, not aggressive, not hyperactive, not slowed, not withdrawn, not actively hallucinating and not combative. Thought content is not paranoid and not delusional. Cognition and memory are impaired. He does not express impulsivity or inappropriate judgment. He does not exhibit a depressed mood. He expresses no homicidal and no suicidal ideation. He expresses no suicidal plans and no homicidal plans. He is communicative. He exhibits normal recent memory and normal remote memory. He is attentive. Nursing note and vitals reviewed.     KAMALA test: neg  Yeoman's test: neg  Gaenslen test: neg     Assessment: 1. Primary osteoarthritis of left hip    2. Chronic pain syndrome            Plan:      · Patient read and signed orientation and opioid agreement. · OARRS reviewed. Current MED: 0  · Patient was not offered naloxone for home. · Discussed long term side effects of medications, tolerance, dependency and addiction. · UDS preformed today. · Patient told can not receive any pain medications from any other source. · No evidence of abuse, diversion or aberrant behavior. · Prescription Needs: No prescription pain medications at this time   Medications and/or procedures to improve function and quality of life- patient understanding with this and that may not be pain free   Discussed possible weaning of medication dosing dependent on treatment/procedure results.  Discussed with patient about safe storage of medications at home   Testing: XR reviewed.  Procedures: Left Hip injection   Discussed with patient about risks with procedure including infection, reaction to medication, increased pain, or bleeding.  Medications:Needs clearance from Plavix      Previous Treatments tried:  · PT: No,  Scheduled for evaluation   · NSAIDs: No,   Blood thinners  · Chiropractic: No,  any benefit? No  · Muscle relaxants: No,  any benefit? No  · Narcotics: No,  any benefit? No  · Spine surgeon consult: No  · Any Implants: No    Meds. Prescribed:   No orders of the defined types were placed in this encounter. Return for Left Hip injection. Time spent with patient was 45 minutes more than 50% was spent  Counseling/coordinated the patient'scare.     Electronically signed by Raphael Whitmore MD on 4/24/2019 at 3:56 PM

## 2019-04-25 PROBLEM — E86.0 DEHYDRATION: Status: RESOLVED | Noted: 2019-03-26 | Resolved: 2019-01-01

## 2019-04-25 NOTE — PROGRESS NOTES
r Casimir Krabbe  [address] [place] [address]  Dept: [phone]  Dept Fax: [phone]  Loc: [phone]    Tressa Blanc is a 80 y.o. male who presents today for his medical conditions/complaints as noted below. Chief Complaint   Patient presents with    Discuss Medications    Arm Pain     right arm-bruise near elbow and decreased range of motion    Other     possible referral for psychiatry           HPI:     Patient brought in by family and aid with concerns about right shoulder pain. Son mentions that last week when patient was doing physical therapy he was able to lift his right arm up above his head and had no troubles with this. 2 days ago however, patient started complaining of right shoulder pain and elbow pain. They did notice a bruise on his right elbow. There is no known fall but son mentions that patient has a railing on his bed and he is concerned the patient rolled into it forcefully. Patient states that his right upper extremity now feels weak and has difficulty raising it above his head. He states that he aches in his shoulder and its a pretty severe pain with weakness. Denies numbness or tingling. Also, son states that his mood has been changing lately he has been more irritable and \"revved. \"  States that he thinks that patient has been almost manic. He states it is really high and energetic and full of life but can get easily upset, and then he dips down to low energy and low and depressed. He states that also his mental status has been fluctuating and a few nights ago he noticed him hallucinating. Patient thought that his bed was a car and he was crying out for nurses as he thought he was in the hospital.  Patient has no recollection of this. He denies any urinary symptoms such as burning, dysuria, hematuria, frequency. He does have incontinence but this is chronic.     Arm Pain    The incident occurred 2 days ago. The incident occurred at home. The injury mechanism is unknown. The pain is present in the right shoulder and upper right arm. The quality of the pain is described as aching. The pain does not radiate. The pain is moderate. The pain has been improving since the incident. Associated symptoms include muscle weakness. Pertinent negatives include no chest pain or tingling. The symptoms are aggravated by movement and lifting. He has tried rest for the symptoms. The treatment provided mild relief. Past Medical History:   Diagnosis Date    Alzheimer's dementia     Anxiety     Arthritis     Atrial fibrillation ([address]     CAD (coronary artery disease)     Dementia     Depression     Fall at home     GERD (gastroesophageal reflux disease)     Heart attack ([address] 5/11/13    Hernia     Hyperlipidemia     Hypertension     Iron deficiency anemia     Irritable bowel syndrome     Pacemaker age 58    Parkinson disease ([address]     Parkinsons ([address]       Past Surgical History:   Procedure Laterality Date    BACK SURGERY  age [de-identified]     CARDIAC SURGERY  age 58    COLONOSCOPY  over ten years ago   [address] [facility]  Patient unsure     PACEMAKER PLACEMENT  age 58, replaced age 80   Ely Specter VASCULAR SURGERY  age 58        Family History   Problem Relation Age of Onset    Heart Disease Father        Social History     Tobacco Use    Smoking status: Never Smoker    Smokeless tobacco: Never Used   Substance Use Topics    Alcohol use: No      Current Outpatient Medications   Medication Sig Dispense Refill    azelastine (OPTIVAR) 0.05 % ophthalmic solution 1 drop 2 times daily as needed (for itchy eyes)      atorvastatin (LIPITOR) 40 MG tablet Take 1 tablet by mouth daily 30 tablet 3    Skin Protectants, Misc. (EUCERIN) cream Apply topically daily for dry skin and itching.  1 Package 0    omeprazole (PRILOSEC) 20 MG delayed release capsule Take 1 capsule by mouth daily 90 capsule 3    meclizine (ANTIVERT) 12.5 MG tablet Take 1 tablet by mouth 3 times daily as needed for Dizziness 90 tablet 6    donepezil (ARICEPT) 5 MG tablet take 1 tablet by mouth at bedtime 30 tablet 3    carvedilol (COREG) 3.125 MG tablet Take 1 tablet by mouth 2 times daily (with meals) 180 tablet 1    isosorbide mononitrate (IMDUR) 30 MG extended release tablet Take 1 tablet by mouth daily 90 tablet 1    Nutritional Supplements (ENSURE COMPLETE) LIQD Take 1 Can by mouth 2 times daily 60 Bottle 11    clopidogrel (PLAVIX) 75 MG tablet Take 1 tablet by mouth daily 90 tablet 1    aspirin 81 MG tablet Take 1 tablet by mouth daily 30 tablet 11    nitroGLYCERIN (NITROSTAT) 0.4 MG SL tablet Place 1 tablet under the tongue every 5 minutes as needed for Chest pain 25 tablet 6    docusate (COLACE, DULCOLAX) 100 MG CAPS Take 100 mg by mouth 2 times daily 180 capsule 3    tamsulosin (FLOMAX) 0.4 MG capsule Take 1 capsule by mouth daily 30 capsule 11    Calcium Carbonate Antacid (TUMS PO) Take by mouth 4 times daily      Misc. Devices [facility]) MISC 1 applicator by Does not apply route continuous. 1 each 0    aluminum & magnesium hydroxide-simethicone (MAALOX ADVANCED) 200-200-20 MG/5ML SUSP suspension Take 5 mLs by mouth every 6 hours as needed for Indigestion. 1 Bottle 0    Cyanocobalamin (VITAMIN B-12 CR) 1000 MCG TBCR Take 1,000 mcg by mouth daily.  Cholecalciferol (VITAMIN D-3) 5000 UNITS TABS Take 1 tablet by mouth daily.  acetaminophen (TYLENOL) 325 MG tablet Take 2 tablets by mouth every 6 hours as needed for Pain (For mild pain level 1-3 or for fever > 100.5). 120 tablet 0    magnesium hydroxide (MILK OF MAGNESIA) 400 MG/5ML suspension Take 30 mLs by mouth daily as needed for Constipation.  multivitamin (THERAGRAN) per tablet Take 1 tablet by mouth daily.  90 tablet 0    PARoxetine (PAXIL) 30 MG tablet Take 1 tablet by mouth every morning 90 tablet 3     No current facility-administered medications for this visit. Allergies   Allergen Reactions    Pcn [Penicillins] Hives    Terramycin [Oxytetracycline]        Health Maintenance   Topic Date Due    Shingles Vaccine (1 of 2) 03/15/1980    DTaP/Tdap/Td vaccine (2 - Td) 03/19/2025    Flu vaccine  Completed    Pneumococcal 65+ years Vaccine  Completed       Subjective:      Review of Systems   Constitutional: Negative for chills, fatigue and fever. HENT: Negative for congestion, rhinorrhea and sore throat. Eyes: Negative for discharge and visual disturbance. Respiratory: Negative for cough, shortness of breath and wheezing. Cardiovascular: Negative for chest pain and palpitations. Gastrointestinal: Negative for abdominal pain, constipation, diarrhea and nausea. Genitourinary: Negative for dysuria and hematuria. Musculoskeletal: Positive for arthralgias and gait problem. Negative for myalgias. Neurological: Positive for weakness. Negative for dizziness, tingling and headaches. Psychiatric/Behavioral: Positive for behavioral problems, confusion, dysphoric mood and sleep disturbance. The patient is not nervous/anxious. Objective:     Physical Exam   Constitutional: He is oriented to person, place, and time. He appears well-developed and well-nourished. HENT:   Head: Normocephalic and atraumatic. Eyes: Conjunctivae and EOM are normal. Right eye exhibits no discharge. Left eye exhibits no discharge. No scleral icterus. Neck: Normal range of motion. Cardiovascular: Normal rate, regular rhythm and normal heart sounds. Pulmonary/Chest: Effort normal and breath sounds normal. He has no wheezes. Abdominal: Soft. Bowel sounds are normal. He exhibits no distension. There is no tenderness. Musculoskeletal: He exhibits no edema. Right shoulder: He exhibits decreased range of motion, tenderness, bony tenderness, pain, spasm and decreased strength. He exhibits no swelling. Right elbow: He exhibits no deformity.  No tenderness found. No radial head tenderness noted. Arms:  Lymphadenopathy:     He has no cervical adenopathy. Neurological: He is alert and oriented to person, place, and time. Coordination normal.   Skin: Skin is warm and dry. Psychiatric: He has a normal mood and affect. His speech is normal. Judgment and thought content normal. He is slowed. Cognition and memory are impaired. Nursing note and vitals reviewed. /62 (Site: Left Upper Arm, Position: Sitting)   Pulse 72   Resp 20   Wt 128 lb (58.1 kg)   SpO2 97%   BMI 22.67 kg/m²     Assessment:       Diagnosis Orders   1. Acute pain of right shoulder  XR SHOULDER RIGHT (MIN 2 VIEWS)   2. Hallucinations  POCT Urinalysis no Sergio Valenzuela MD, Psychiatry, [email or web address]   3. Mood changes  POCT Urinalysis no Sergio Valenzuela MD, Psychiatry, [facility] [email or web address]       Plan: Will get xray of right shoulder to eval shoulder pain. Cont with PT  Refer to psych for mood changes  UA was wnl    Discussed use, benefit, and side effects of prescribed medications. Barriers tomedication compliance addressed. All patient questions answered. Pt voiced understanding. No follow-ups on file. Orders Placed This Encounter   Procedures    XR SHOULDER RIGHT (MIN 2 VIEWS)     Standing Status:   Future     Standing Expiration Date:   4/25/2020     Order Specific Question:   Reason for exam:     Answer:   shoulder pain   Bentley Torres MD, Psychiatry, [facility] [email or web address]     Referral Priority:   Routine     Referral Type:   Consult for Advice and Opinion     Referral Reason:   Specialty Services Required     Referred to Provider:   Emilia Cruz MD     Requested Specialty:   Psychiatry     Number of Visits Requested:   1    POCT Urinalysis no Micro     No orders of the defined types were placed in this encounter.        Reccommended tobacco cessation options including pharmacologicmethods, counseled great than 3 minutes during this visit:  Yes  []  No []     Patient given educational materials - see patient instructions. Discussed use, benefit, and sideeffects of prescribed medications. All patient questions answered. Pt voiced understanding. Reviewed health maintenance. Instructed to continue current medications, diet and exercise. Patient agreed with treatment plan. Follow up as directed.      Electronically signed by Brant Cervantes MD on 4/25/2019 at 11:25 AM

## 2019-04-28 NOTE — TELEPHONE ENCOUNTER
Patient has extensive CAD that is unrevascularized. Recommend to proceed with hip injection on Aspirin/plavix if possible.

## 2019-04-30 NOTE — TELEPHONE ENCOUNTER
Bharti Rodriguez (son) notified and voiced understanding. Form out for Dr Genesis Rodriguez to sign.
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
## 2019-05-13 NOTE — TELEPHONE ENCOUNTER
Alfred Gomez called requesting a refill on the following medications:  Requested Prescriptions     Pending Prescriptions Disp Refills    atorvastatin (LIPITOR) 40 MG tablet 30 tablet 3     Sig: Take 1 tablet by mouth daily    clopidogrel (PLAVIX) 75 MG tablet 90 tablet 1     Sig: Take 1 tablet by mouth daily    isosorbide mononitrate (IMDUR) 30 MG extended release tablet 90 tablet 1     Sig: Take 1 tablet by mouth daily    carvedilol (COREG) 3.125 MG tablet 180 tablet 1     Sig: Take 1 tablet by mouth 2 times daily (with meals)    donepezil (ARICEPT) 5 MG tablet 30 tablet 3     Pharm[facility] verified:  [facility] [address]      Date of last visit: 4/25/19  Date of next visit (if applicable): 4/00/4994

## 2019-05-15 NOTE — TELEPHONE ENCOUNTER
Alfred Higgins called requesting a refill on the following medications:  Requested Prescriptions     Pending Prescriptions Disp Refills    meclizine (ANTIVERT) 12.5 MG tablet 90 tablet 6     Sig: Take 1 tablet by mouth 3 times daily as needed for Dizziness    tamsulosin (FLOMAX) 0.4 MG capsule 30 capsule 11     Sig: Take 1 capsule by mouth daily     Pharmacy verified:[facility]      Date of last visit: 4/25  Date of next visit (if applicable): 2/73/3242

## 2019-05-16 NOTE — ED PROVIDER NOTES
[facility]  Urgent Care Encounter       CHIEF COMPLAINT       Chief Complaint   Patient presents with    Fall     yesterday was being assisted to wheelchair and left leg gave out and went to floor hitting face on are off wheelchair, and had laceration right arm       Nurses Notes reviewed and I agree except as noted in the HPI. HISTORY OF PRESENT ILLNESS   Alfred Cox is a 80 y.o. male who presents for evaluation of nasal bruising/swelling as well as a laceration to the right forearm after a fall greater than 24 hours before arrival.  Patient's son at bedside states that the patient was standing on his wheelchair when his leg \"gave out\" causing him to fall forward inhale his forearm and his nose directly on his wheelchair. Patient is a somewhat poor historian due to history of dementia. Patient currently denies any headaches and sounds at bedside states that he has not complained of any headaches or had any nausea, vomiting, fever, chills. Sons do state that the patient have a \"slow nosebleed trickle\" yesterday for 3 hours after the incident, however this has since resolved. Patient is on Plavix. Patient's son states that the patient has been able to relate since incident and has been walking around with his walker. The history is provided by the patient and a relative. REVIEW OF SYSTEMS     Review of Systems   Constitutional: Negative for chills and fever. HENT: Positive for nosebleeds. Negative for congestion and sore throat. Respiratory: Negative for cough and shortness of breath. Cardiovascular: Negative for chest pain. Gastrointestinal: Negative for nausea and vomiting. Musculoskeletal: Positive for gait problem. Negative for arthralgias and myalgias. Skin: Positive for wound. Negative for rash. Allergic/Immunologic: Negative for environmental allergies. Neurological: Negative for tremors, weakness and headaches.    Hematological: Bruises/bleeds easily (on plavix). PAST MEDICAL HISTORY         Diagnosis Date    Alzheimer's dementia     Anxiety     Arthritis     Atrial [address] [address]     CAD (coronary artery disease)     Dementia     Depression     Fall at home     GERD (gastroesophageal reflux disease)     Heart attack ([facility] [address] 5/11/13    Hernia     Hyperlipidemia     Hypertension     Iron deficiency anemia     Irritable bowel syndrome     Pacemaker age 58    Parkinson disease ([facility] [address]     Parkinsons ([facility] [address] [facility]        SURGICALHISTORY     Patient  has a past surgical history that includes Cardiac surgery (age 58); pacemaker placement (age 58, replaced age 80); vascular surgery (age 58 ); Colonoscopy (over ten years ago); hernia repair (Patient unsure ); and back surgery (age [de-identified] ). CURRENT MEDICATIONS       Previous Medications    ACETAMINOPHEN (TYLENOL) 325 MG TABLET    Take 2 tablets by mouth every 6 hours as needed for Pain (For mild pain level 1-3 or for fever > 100.5). ALUMINUM & MAGNESIUM HYDROXIDE-SIMETHICONE (MAALOX ADVANCED) 200-200-20 MG/5ML SUSP SUSPENSION    Take 5 mLs by mouth every 6 hours as needed for Indigestion. ASPIRIN 81 MG TABLET    Take 1 tablet by mouth daily    ATORVASTATIN (LIPITOR) 40 MG TABLET    Take 1 tablet by mouth daily    AZELASTINE (OPTIVAR) 0.05 % OPHTHALMIC SOLUTION    1 drop 2 times daily as needed (for itchy eyes)    CALCIUM CARBONATE ANTACID (TUMS PO)    Take by mouth 4 times daily    CARVEDILOL (COREG) 3.125 MG TABLET    Take 1 tablet by mouth 2 times daily (with meals)    CHOLECALCIFEROL (VITAMIN D-3) 5000 UNITS TABS    Take 1 tablet by mouth daily. CLOPIDOGREL (PLAVIX) 75 MG TABLET    Take 1 tablet by mouth daily    CYANOCOBALAMIN (VITAMIN B-12 CR) 1000 MCG TBCR    Take 1,000 mcg by mouth daily.     DOCUSATE (COLACE, DULCOLAX) 100 MG CAPS    Take 100 mg by mouth 2 times daily    DONEPEZIL (ARICEPT) 5 MG TABLET    take 1 tablet by mouth at bedtime    ISOSORBIDE MONONITRATE (IMDUR) 30 MG EXTENDED RELEASE TABLET    Take 1 tablet by mouth daily    LISINOPRIL (PRINIVIL;ZESTRIL) 5 MG TABLET    Take 1 tablet by mouth daily    MAGNESIUM HYDROXIDE (MILK OF MAGNESIA) 400 MG/5ML SUSPENSION    Take 30 mLs by mouth daily as needed for Constipation. MECLIZINE (ANTIVERT) 12.5 MG TABLET    Take 1 tablet by mouth 3 times daily as needed for Dizziness    MISC. DEVICES [facility]) MISC    1 applicator by Does not apply route continuous. MULTIVITAMIN (THERAGRAN) PER TABLET    Take 1 tablet by mouth daily. NITROGLYCERIN (NITROSTAT) 0.4 MG SL TABLET    Place 1 tablet under the tongue every 5 minutes as needed for Chest pain    NUTRITIONAL SUPPLEMENTS (ENSURE COMPLETE) LIQD    Take 1 Can by mouth 2 times daily    OMEPRAZOLE (PRILOSEC) 20 MG DELAYED RELEASE CAPSULE    Take 1 capsule by mouth daily    PAROXETINE (PAXIL) 30 MG TABLET    Take 1 tablet by mouth every morning    QUETIAPINE (SEROQUEL) 25 MG TABLET    Take a half tab once nightly as needed for agitation or manic behavior. SKIN PROTECTANTS, MISC. (EUCERIN) CREAM    Apply topically daily for dry skin and itching. TAMSULOSIN (FLOMAX) 0.4 MG CAPSULE    Take 1 capsule by mouth daily    UNABLE TO FIND           ALLERGIES     Patient is is allergic to pcn [penicillins] and terramycin [oxytetracycline]. Patients   Immunization History   Administered Date(s) Administered    Influenza Vaccine, unspecified formulation 10/10/2014    Influenza Virus Vaccine 10/21/2012    Influenza, High Dose (Fluzone 65 yrs and older) 11/19/2018    Influenza, Genetta Kiang, 3 Years and older, IM (Fluzone 3 yrs and older or Afluria 5 yrs and older) 10/24/2017    Pneumococcal 13-valent Conjugate (Cufpjqs50) 07/27/2017    Pneumococcal Polysaccharide (Sbybebczs55) 11/19/2018    Tdap (Boostrix, Adacel) 03/19/2015       FAMILY HISTORY     Patient's family history includes Heart Disease in his father. SOCIAL HISTORY     Patient  reports that he has never smoked.  He has never

## 2019-05-16 NOTE — ED NOTES
Non stick dressing applied to right forearm skin tear followed by taylor.       Parag Soler RN  05/16/19 0871

## 2019-05-16 NOTE — TELEPHONE ENCOUNTER
Message from [employer] sent at 5/16/2019  1:50 PM EDT     Summary: fell out of wheelchair    Elisha Cough from [address] calling stating patient fell out of wheelchair on 5/15/19 and face is bruised            Call History      Type Contact Phone User   05/16/2019 01:50 PM Phone (Incoming) Melanie Esqueda [phone] [employer]       Reason for Disposition   Very deformed or crooked nose     Plus concern of the bruises under both eyes    Answer Assessment - Initial Assessment Questions  1. MECHANISM: \"How did the injury happen? \"       Britney Monreal out of a wheelchair   2. ONSET: \"When did the injury happen? \" (Minutes or hours ago)       Last evening   3. LOCATION: \"What part of the nose is injured? \"       Nose is swollen and is not in alignment   4. APPEARANCE of INJURY: \"What does the nose look like? \"       Bruised, swollen   5. BLEEDING: \"Is the nose still bleeding? \" If so, ask: \"Is it difficult to stop? \"       No-but bled for three hours last night   6. SIZE: For cuts, bruises, or swelling, ask: \"How large is it? \" (e.g., inches or centimeters;  entire nose)       Bruising extends under both eyes as well- racoon look   7. PAIN: \"Is it painful? \" If so, ask: \"How bad is the pain? \"   (Scale 1-10; or mild, moderate, severe)      \"pretty painful\"  8. TETANUS: For any breaks in the skin, ask: \"When was the last tetanus booster? \"      unsure  9. OTHER SYMPTOMS: \"Do you have any other symptoms? \" (e.g., headache, neck pain, loss of consciousness)      Arm injury- R arm below the elbow -bandage on now- has not seen under that yet10. PREGNANCY: \"Is there any chance you are pregnant? \" \"When was your last menstrual period? \"        na    Protocols used: NOSE INJURY-ADULT-
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
## 2019-05-21 NOTE — TELEPHONE ENCOUNTER
Alfred Jacobs called requesting a refill on the following medications:  Requested Prescriptions     Pending Prescriptions Disp Refills    carvedilol (COREG) 3.125 MG tablet 180 tablet 3     Sig: Take 1 tablet by mouth 2 times daily (with meals)     [facility]  . pv      Date of last visit: 04/25/19  Date of next visit (if applicable): 0/49/2203

## 2019-05-30 NOTE — TELEPHONE ENCOUNTER
debra from [facility] health care asking for verbal order for PT for generalized weakness and decreased balance. Would like to see him 3 x a week for 3 weeks and 1 time a week for 1 week. They will send over a written order as well.

## 2019-05-30 NOTE — TELEPHONE ENCOUNTER
Left message for Eugenia and let her know that Sarabjit Lucero gave a verbal ok but that I believe Dr. Gin Reyes may have to sign the order.

## 2019-06-04 NOTE — TELEPHONE ENCOUNTER
Alfred Franco Cea called requesting a refill on the following medications:  Requested Prescriptions     Pending Prescriptions Disp Refills    atorvastatin (LIPITOR) 40 MG tablet 90 tablet 3     Sig: Take 1 tablet by mouth daily     Ph[facility] on [address]  . pv      Date of last visit: 4/25/19  Date of next visit (if applicable): 4/66/8189

## 2019-06-17 NOTE — TELEPHONE ENCOUNTER
[facility]) called into the office stating that he feels he has food poisoning so he is unable to bring Gene in for his appt that is scheduled for today at 1pm.     He has r/sed for 08/01/19.

## 2019-08-01 NOTE — PROGRESS NOTES
[facility]  [facility] [address]  [phone]    Progress Note    Patient:  Alfred Winston  YOB: 1930  PCP:  Humberto Lockhart MD  Visit Date:  8/1/2019      Chief Complaint   Patient presents with    Follow-up    Medication Check       SUBJECTIVE:      Feliberto Larkin, a 80 y.o. male, presents for a follow up visit. Patient reports he is doing well. Patient is compliant with medication regimen. He presents with his two sons. Peter Taylor and Rasheed Crawley. Notes the first dose of 12.5 mg Seroquel helped sundowning, \"It was great. \" They note after the first day it stopped helping. Notes he was back to being delusional and \"manicky\". Tried another 3-4 days of it. They have stopped the Seroquel. Notes his behavior has improved. Still trouble with memory. Gets disoriented if indoors. Does ok when outside. Up a lot over the night to use the bathroom. Up about every 2 hours. 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 Noticed things missing. Jean Spatz has gone through the course for guardianship. Looking for another home health agency. I advised they may check with [facility]. Sleeps around the clock. Feels he is losing weight. He eats twice daily. Sometimes once daily. They add protein to his food. He gets about $2,000 per month via his pension. Denies any AVH. He complains of pain often. They would like to try Paxil increase for concern of depression. Has been on same dose x 7 years. When depressed tends to talk about people back home. Noticed him doing worse over winter, more \"cycling\"  Pt denies any SI. He is pleasant and denies any complaints. Past Medical, Social, Family Hx:    ROS:  Med Trials:Paxil, Pamelor, Aricept, Xanax, Seroquel    OBJECTIVE:  Vitals: There were no vitals taken for this visit. MENTAL STATUS EXAM:    GENERAL  Build:  Thin    Hygiene:

## 2019-12-12 PROBLEM — R55 SYNCOPE AND COLLAPSE: Status: ACTIVE | Noted: 2019-01-01

## 2019-12-13 PROBLEM — N30.00 ACUTE CYSTITIS WITHOUT HEMATURIA: Status: ACTIVE | Noted: 2019-01-01

## 2019-12-13 PROBLEM — R53.81 PHYSICAL DECONDITIONING: Status: ACTIVE | Noted: 2019-01-01

## 2019-12-13 PROBLEM — D63.1 ANEMIA DUE TO CHRONIC KIDNEY DISEASE: Status: ACTIVE | Noted: 2019-01-01

## 2019-12-13 PROBLEM — N18.9 ANEMIA DUE TO CHRONIC KIDNEY DISEASE: Status: ACTIVE | Noted: 2019-01-01

## 2019-12-13 PROBLEM — N32.89 MASS OF BLADDER: Status: ACTIVE | Noted: 2019-01-01

## 2019-12-13 PROBLEM — E87.6 HYPOKALEMIA: Status: ACTIVE | Noted: 2019-01-01

## 2020-01-01 ENCOUNTER — TELEPHONE (OUTPATIENT)
Dept: FAMILY MEDICINE CLINIC | Age: 85
End: 2020-01-01

## 2020-01-01 ENCOUNTER — TELEPHONE (OUTPATIENT)
Dept: PSYCHIATRY | Age: 85
End: 2020-01-01

## 2020-01-01 ENCOUNTER — TELEPHONE (OUTPATIENT)
Dept: CARDIOLOGY CLINIC | Age: 85
End: 2020-01-01

## 2020-01-01 RX ORDER — TAMSULOSIN HYDROCHLORIDE 0.4 MG/1
CAPSULE ORAL
Qty: 90 CAPSULE | Refills: 3 | Status: SHIPPED | OUTPATIENT
Start: 2020-01-01

## 2020-01-08 NOTE — TELEPHONE ENCOUNTER
FYI: We received a call from [address] at [address] who had an initial visit at the pts residence to discuss him starting on hospice. Per Lizette Richardson pt will qualify for services he was non responsive not eating much or drinking much. Lizette Richardson has also contacted Adult Protective Services due to the home conditions to give them an update on the pt as well. Lizetteeli Richardson spoke to [facility] the home health agency for the pt and they mentioned he has been set up for a psych evaluation at the request of the pts 2 sons.

## 2020-01-23 NOTE — TELEPHONE ENCOUNTER
Sushil Wells (POA son) called into the office stating that his father had missed his last appt on 01/21/19 due to his father not doing well. He said that his father is loosing mobility and he has set up for a  to come into the home to discuss options. He said that his father will turn 80 in about 6 weeks; he would like to keep this provider as his doctor but he wanted to update her on what was going on. He said as for right now; he will not schedule an appt because he is unsure if he will truly be able to make it. I informed him that I would let this provider know.

## 2020-01-28 NOTE — TELEPHONE ENCOUNTER
[facility] called and stated that last week the patient was constipated and they were going to try Mirilax. Nurse was there yesterday and was told that the patient is not eating well and has not had a bowel movement in over a week. He is not distended and does not have bowel sounds.      [phone] ext [phone]  Or fax to [phone]

## 2020-02-19 NOTE — TELEPHONE ENCOUNTER
Now that he is on hospice, he does not need to take his imdur or statin. We should focus on comfort and he no longer needs to take other chronic meds unless they aid in comfort.  thanks

## 2020-02-19 NOTE — TELEPHONE ENCOUNTER
Called spoke to the pts son Natalia Walker who verbally understood and will call if at some point pt will need these meds again for comfort.

## 2020-02-19 NOTE — TELEPHONE ENCOUNTER
We received a call from pts mara Solis stating that due to pts decline in health they have moved him to Hospice care with [facility]. Pt stated the CNP wanted the pt to contact Dr Gris Lora regarding refills and still needing to take his Imdur and Lipitor. If pt is to remain on these meds he will need a refill sent to [facility].  Please advise    Call pts mara Stafford at [phone]

## 2020-04-03 ENCOUNTER — TELEPHONE (OUTPATIENT)
Dept: FAMILY MEDICINE CLINIC | Age: 85
End: 2020-04-03

## 2020-04-03 NOTE — TELEPHONE ENCOUNTER
Called spoke to [facility] in [facility] DEBBIE MACKENZIE II.VIERTEL to let them know that Monday Dr Zohra Gale will be able to sign the death cert for the pt since she is out of the office till then.      Vadim phone [phone]

## 2025-07-10 NOTE — H&P
capsule Take 1 capsule by mouth daily 7/27/17   Tj Bird APRN - CNP   Calcium Carbonate Antacid (TUMS PO) Take by mouth 4 times daily    Historical Provider, MD   Misc. Devices [facility]) MISC 1 applicator by Does not apply route continuous. 6/2/14   Carmen Silva MD   aluminum & magnesium hydroxide-simethicone (MAALOX ADVANCED) 200-200-20 MG/5ML SUSP suspension Take 5 mLs by mouth every 6 hours as needed for Indigestion. 4/23/14   Alfredo Darling MD   Cyanocobalamin (VITAMIN B-12 CR) 1000 MCG TBCR Take 1,000 mcg by mouth daily. Historical Provider, MD   Cholecalciferol (VITAMIN D-3) 5000 UNITS TABS Take 1 tablet by mouth daily. Historical Provider, MD   acetaminophen (TYLENOL) 325 MG tablet Take 2 tablets by mouth every 6 hours as needed for Pain (For mild pain level 1-3 or for fever > 100.5). 9/6/12   Milka Paula MD   magnesium hydroxide (MILK OF MAGNESIA) 400 MG/5ML suspension Take 30 mLs by mouth daily as needed for Constipation. Milka Paula MD   multivitamin [facility]) per tablet Take 1 tablet by mouth daily. 9/6/12   Milka Paula MD       Allergies:  Pcn [penicillins] and Terramycin [oxytetracycline]    Social History:      The patient currently lives with son at home. TOBACCO:   reports that he quit smoking about 10 years ago. His smoking use included cigars. He quit after 3.00 years of use. He has never used smokeless tobacco.  ETOH:   reports that he does not drink alcohol. Family History:      Reviewed in detail and negative for DM, CAD, Cancer, CVA. Positive as follows:        Problem Relation Age of Onset    Heart Disease Father        Diet:  DIET CARB CONTROL;    REVIEW OF SYSTEMS:   Pertinent positives as noted in the HPI. All other systems reviewed and negative.     PHYSICAL EXAM:    /70   Pulse 76   Temp 97.5 °F (36.4 °C) (Rectal)   Resp 16   Ht 5' 3\" (1.6 m)   Wt 130 lb (59 kg)   SpO2 96%   BMI 23.03 kg/m²     General appearance:  No apparent distress, appears malnourished, and cooperative. HEENT:  Normal cephalic, atraumatic without obvious deformity. Pupils equal, pinpoint. Extra ocular muscles intact. Conjunctivae/corneas clear. Neck: Supple, with full range of motion. No jugular venous distention. Trachea midline. Respiratory:  Normal respiratory effort. Clear to auscultation, bilaterally without Rales/Wheezes/Rhonchi. Cardiovascular:  Regular rate and rhythm with normal S1/S2 without murmurs, rubs or gallops. Abdomen: Soft, diffusely tender, non-distended with normal bowel sounds. Musculoskeletal:  No clubbing, cyanosis or edema bilaterally. Full range of motion without deformity. Skin: Skin color, texture, turgor normal.  No rashes or lesions. Seborrheic keratosis present diffusely. Neurologic:  Neurovascularly intact without any focal sensory/motor deficits. Cranial nerves: II-XII intact, grossly non-focal.  Psychiatric:  Alert and oriented to person, thought content appropriate, normal insight  Capillary Refill: Brisk,< 3 seconds   Peripheral Pulses: +2 palpable, equal bilaterally       Labs:     Recent Labs     03/23/19  0340   WBC 11.9*   HGB 15.0   HCT 46.4        Recent Labs     03/23/19  0340      K 4.2      CO2 23   BUN 43*   CREATININE 1.7*   CALCIUM 10.4     Recent Labs     03/23/19  0340   AST 15   ALT 8*   BILITOT 0.7   ALKPHOS 60     Recent Labs     03/23/19  0340   INR 0.97     No results for input(s): Nigel Cr in the last 72 hours. Urinalysis:      Lab Results   Component Value Date    NITRU NEGATIVE 02/20/2019    WBCUA NONE SEEN 02/20/2019    WBCUA 0-2 06/07/2012    BACTERIA NONE 02/20/2019    RBCUA 0-2 02/20/2019    BLOODU SMALL 02/20/2019    SPECGRAV 1.019 06/19/2014    GLUCOSEU NEGATIVE 02/20/2019       Intake & Output:  No intake/output data recorded. No intake/output data recorded. Radiology:     CXR: I have reviewed the CXR with the following interpretation:  \"No acute cardiopulmonary disease. \"    CT HEAD WO CONTRAST   Final Result      No acute ischemic infarct, hemorrhage, or mass effect. Aging changes as discussed above. **This report has been created using voice recognition software. It may contain minor errors which are inherent in voice recognition technology. **      Final report electronically signed by Dr. Yvette Vail on 3/23/2019 4:40 AM      XR CHEST 1 VW   Final Result      No acute cardiopulmonary disease. **This report has been created using voice recognition software. It may contain minor errors which are inherent in voice recognition technology. **      Final report electronically signed by Dr. Yvette Vail on 3/23/2019 4:31 AM           DVT prophylaxis: {dvt prophylaxis:89280    Code Status: Prior      PT/OT Eval Status: on case    Broderick Alvarado    Diagnosis Date Noted    Altered mental status [R41.82] 03/23/2019    [facility]acute kidney [facility] ([facility] [N17.9] 03/23/2019    Generalized abdominal pain [R10.84] 03/23/2019       PLAN:    1. Altered mental status likely due to metabolic encephalopathy   - pt is confused, per son has been lethargic and hardly eating/drinking for past 2 weeks  - WBC of 11.9, lactic acid pending, procalcitonin of .11, glucose of 169, monitor clinical status   - CT negative, Flu negative, venous blood gas unremarkable  - blood cultures pending, UA pending  - elevated BNP of 3842, clear lung sounds, no edema present     2. Suspected OLMAN  - elevated creatinine of 1.7, continue to monitor  -BUN of 43, IVFs, monitor clinical course       3. Diffuse abdominal pain   - KUB pending, denies n/v, no distention present  - management pending results, symptomatic control at this time     Continue home medications        Thank you Ludy Perez MD for the opportunity to be involved in this patient's care.     Electronically signed by Rogelio Cortes PA-C on 3/23/2019 at 5:54 AM Yes